# Patient Record
Sex: FEMALE | Race: WHITE | HISPANIC OR LATINO | URBAN - METROPOLITAN AREA
[De-identification: names, ages, dates, MRNs, and addresses within clinical notes are randomized per-mention and may not be internally consistent; named-entity substitution may affect disease eponyms.]

---

## 2017-06-01 ENCOUNTER — EMERGENCY (EMERGENCY)
Facility: HOSPITAL | Age: 27
LOS: 1 days | Discharge: HOME | End: 2017-06-01
Attending: EMERGENCY MEDICINE | Admitting: EMERGENCY MEDICINE
Payer: COMMERCIAL

## 2017-06-01 VITALS
DIASTOLIC BLOOD PRESSURE: 90 MMHG | HEART RATE: 98 BPM | HEIGHT: 62 IN | RESPIRATION RATE: 20 BRPM | TEMPERATURE: 98 F | SYSTOLIC BLOOD PRESSURE: 160 MMHG | OXYGEN SATURATION: 99 % | WEIGHT: 240.08 LBS

## 2017-06-01 DIAGNOSIS — Z98.89 OTHER SPECIFIED POSTPROCEDURAL STATES: Chronic | ICD-10-CM

## 2017-06-01 DIAGNOSIS — L05.91 PILONIDAL CYST WITHOUT ABSCESS: Chronic | ICD-10-CM

## 2017-06-01 PROCEDURE — 99284 EMERGENCY DEPT VISIT MOD MDM: CPT | Mod: 25

## 2017-06-02 LAB
APPEARANCE UR: ABNORMAL
BACTERIA # UR AUTO: ABNORMAL
BILIRUB UR-MCNC: NEGATIVE — SIGNIFICANT CHANGE UP
COD CRY URNS QL: ABNORMAL
COLOR SPEC: YELLOW — SIGNIFICANT CHANGE UP
COMMENT - URINE: SIGNIFICANT CHANGE UP
DIFF PNL FLD: ABNORMAL
EPI CELLS # UR: ABNORMAL
GLUCOSE UR QL: NEGATIVE MG/DL — SIGNIFICANT CHANGE UP
HCG UR QL: NEGATIVE — SIGNIFICANT CHANGE UP
KETONES UR-MCNC: ABNORMAL
LEUKOCYTE ESTERASE UR-ACNC: ABNORMAL
NITRITE UR-MCNC: NEGATIVE — SIGNIFICANT CHANGE UP
PH UR: 6 — SIGNIFICANT CHANGE UP (ref 5–8)
PROT UR-MCNC: 30 MG/DL
RBC CASTS # UR COMP ASSIST: SIGNIFICANT CHANGE UP /HPF (ref 0–4)
SP GR SPEC: 1.02 — SIGNIFICANT CHANGE UP (ref 1.01–1.02)
UROBILINOGEN FLD QL: NEGATIVE MG/DL — SIGNIFICANT CHANGE UP
WBC UR QL: SIGNIFICANT CHANGE UP

## 2017-06-02 PROCEDURE — 81001 URINALYSIS AUTO W/SCOPE: CPT

## 2017-06-02 PROCEDURE — 99283 EMERGENCY DEPT VISIT LOW MDM: CPT | Mod: 25

## 2017-06-02 PROCEDURE — 81025 URINE PREGNANCY TEST: CPT

## 2017-06-02 PROCEDURE — 96372 THER/PROPH/DIAG INJ SC/IM: CPT

## 2017-06-02 RX ORDER — METHOCARBAMOL 500 MG/1
1500 TABLET, FILM COATED ORAL ONCE
Qty: 0 | Refills: 0 | Status: COMPLETED | OUTPATIENT
Start: 2017-06-02 | End: 2017-06-02

## 2017-06-02 RX ORDER — IBUPROFEN 200 MG
1 TABLET ORAL
Qty: 15 | Refills: 0
Start: 2017-06-02 | End: 2017-06-07

## 2017-06-02 RX ORDER — KETOROLAC TROMETHAMINE 30 MG/ML
30 SYRINGE (ML) INJECTION ONCE
Qty: 0 | Refills: 0 | Status: DISCONTINUED | OUTPATIENT
Start: 2017-06-02 | End: 2017-06-02

## 2017-06-02 RX ORDER — METHOCARBAMOL 500 MG/1
2 TABLET, FILM COATED ORAL
Qty: 24 | Refills: 0
Start: 2017-06-02 | End: 2017-06-06

## 2017-06-02 RX ADMIN — METHOCARBAMOL 1500 MILLIGRAM(S): 500 TABLET, FILM COATED ORAL at 02:57

## 2017-06-02 RX ADMIN — Medication 30 MILLIGRAM(S): at 02:57

## 2017-06-02 NOTE — ED PROVIDER NOTE - OBJECTIVE STATEMENT
pt is a 26yo female with no significant pmhx c/o back pain x 5 days. pt reports on sunday she got out of bed and "twisted funny". pt endorses constant squeezing lower back pain with radiation to BL buttocks with "butt swelling". pt reports she took ibuprofen for the pain, last time earlier today. pt is a 26yo female with no significant pmhx c/o back pain x 5 days. pt reports on sunday she got out of bed and "twisted funny". pt endorses constant squeezing lower back pain with radiation to BL buttocks with "butt swelling". pt reports she took ibuprofen for the pain, last time earlier today. pt asking for work note. NKDA

## 2017-06-02 NOTE — ED PROVIDER NOTE - NS ED ROS FT
pt denies dysuria, saddle anesthesia, bowel or bladder incontinence, no injury to back, no heavy lifting.

## 2017-06-02 NOTE — ED PROVIDER NOTE - PROGRESS NOTE DETAILS
Ua reviewed. pt improved with robaxin and toradol. advised pt to follow up with PMD and spine center if pain persists. will rx robaxin and motrin. pt verbalized understanding and agreement with plan and dx will dc

## 2017-06-02 NOTE — ED PROVIDER NOTE - ATTENDING CONTRIBUTION TO CARE
27y old with complaints of back pain, able to walk, able to pass urien, and stool, , I personally saw the patient with the PA, and completed the key components of the history and physical exam. I then discussed the management plan with the PA.

## 2017-06-02 NOTE — ED PROVIDER NOTE - MEDICAL DECISION MAKING DETAILS
pt is a 28yo female with back pain without injury; therefore, no need for imaging at this time. pt neurologically intact. no muscle tenderness noted on examination. will do UA and given toradol/robaxin and re-evaluate.

## 2018-01-29 ENCOUNTER — APPOINTMENT (OUTPATIENT)
Dept: PLASTIC SURGERY | Facility: CLINIC | Age: 28
End: 2018-01-29

## 2019-10-22 ENCOUNTER — EMERGENCY (EMERGENCY)
Facility: HOSPITAL | Age: 29
LOS: 1 days | Discharge: HOME | End: 2019-10-22
Attending: STUDENT IN AN ORGANIZED HEALTH CARE EDUCATION/TRAINING PROGRAM
Payer: COMMERCIAL

## 2019-10-22 VITALS
SYSTOLIC BLOOD PRESSURE: 157 MMHG | HEART RATE: 95 BPM | TEMPERATURE: 98 F | RESPIRATION RATE: 20 BRPM | OXYGEN SATURATION: 94 % | HEIGHT: 62 IN | DIASTOLIC BLOOD PRESSURE: 99 MMHG | WEIGHT: 244.93 LBS

## 2019-10-22 DIAGNOSIS — L05.91 PILONIDAL CYST WITHOUT ABSCESS: Chronic | ICD-10-CM

## 2019-10-22 DIAGNOSIS — Z98.89 OTHER SPECIFIED POSTPROCEDURAL STATES: Chronic | ICD-10-CM

## 2019-10-22 LAB
ALBUMIN SERPL ELPH-MCNC: 4.3 G/DL — SIGNIFICANT CHANGE UP (ref 3.3–5.2)
ALP SERPL-CCNC: 82 U/L — SIGNIFICANT CHANGE UP (ref 40–120)
ALT FLD-CCNC: 23 U/L — SIGNIFICANT CHANGE UP
ANION GAP SERPL CALC-SCNC: 13 MMOL/L — SIGNIFICANT CHANGE UP (ref 5–17)
AST SERPL-CCNC: 24 U/L — SIGNIFICANT CHANGE UP
BASOPHILS # BLD AUTO: 0.04 K/UL — SIGNIFICANT CHANGE UP (ref 0–0.2)
BASOPHILS NFR BLD AUTO: 0.4 % — SIGNIFICANT CHANGE UP (ref 0–2)
BILIRUB SERPL-MCNC: 0.3 MG/DL — LOW (ref 0.4–2)
BUN SERPL-MCNC: 9 MG/DL — SIGNIFICANT CHANGE UP (ref 8–20)
CALCIUM SERPL-MCNC: 9.1 MG/DL — SIGNIFICANT CHANGE UP (ref 8.6–10.2)
CHLORIDE SERPL-SCNC: 100 MMOL/L — SIGNIFICANT CHANGE UP (ref 98–107)
CO2 SERPL-SCNC: 24 MMOL/L — SIGNIFICANT CHANGE UP (ref 22–29)
CREAT SERPL-MCNC: 0.75 MG/DL — SIGNIFICANT CHANGE UP (ref 0.5–1.3)
D DIMER BLD IA.RAPID-MCNC: 227 NG/ML DDU — SIGNIFICANT CHANGE UP
EOSINOPHIL # BLD AUTO: 0.18 K/UL — SIGNIFICANT CHANGE UP (ref 0–0.5)
EOSINOPHIL NFR BLD AUTO: 1.9 % — SIGNIFICANT CHANGE UP (ref 0–6)
GLUCOSE SERPL-MCNC: 93 MG/DL — SIGNIFICANT CHANGE UP (ref 70–115)
HCG SERPL-ACNC: <4 MIU/ML — SIGNIFICANT CHANGE UP
HCT VFR BLD CALC: 40.4 % — SIGNIFICANT CHANGE UP (ref 34.5–45)
HGB BLD-MCNC: 12.9 G/DL — SIGNIFICANT CHANGE UP (ref 11.5–15.5)
IMM GRANULOCYTES NFR BLD AUTO: 0.3 % — SIGNIFICANT CHANGE UP (ref 0–1.5)
LYMPHOCYTES # BLD AUTO: 1.73 K/UL — SIGNIFICANT CHANGE UP (ref 1–3.3)
LYMPHOCYTES # BLD AUTO: 18.5 % — SIGNIFICANT CHANGE UP (ref 13–44)
MAGNESIUM SERPL-MCNC: 2.1 MG/DL — SIGNIFICANT CHANGE UP (ref 1.6–2.6)
MCHC RBC-ENTMCNC: 27.2 PG — SIGNIFICANT CHANGE UP (ref 27–34)
MCHC RBC-ENTMCNC: 31.9 GM/DL — LOW (ref 32–36)
MCV RBC AUTO: 85.1 FL — SIGNIFICANT CHANGE UP (ref 80–100)
MONOCYTES # BLD AUTO: 0.67 K/UL — SIGNIFICANT CHANGE UP (ref 0–0.9)
MONOCYTES NFR BLD AUTO: 7.2 % — SIGNIFICANT CHANGE UP (ref 2–14)
NEUTROPHILS # BLD AUTO: 6.7 K/UL — SIGNIFICANT CHANGE UP (ref 1.8–7.4)
NEUTROPHILS NFR BLD AUTO: 71.7 % — SIGNIFICANT CHANGE UP (ref 43–77)
PLATELET # BLD AUTO: 290 K/UL — SIGNIFICANT CHANGE UP (ref 150–400)
POTASSIUM SERPL-MCNC: 4.3 MMOL/L — SIGNIFICANT CHANGE UP (ref 3.5–5.3)
POTASSIUM SERPL-SCNC: 4.3 MMOL/L — SIGNIFICANT CHANGE UP (ref 3.5–5.3)
PROT SERPL-MCNC: 7.6 G/DL — SIGNIFICANT CHANGE UP (ref 6.6–8.7)
RBC # BLD: 4.75 M/UL — SIGNIFICANT CHANGE UP (ref 3.8–5.2)
RBC # FLD: 12.6 % — SIGNIFICANT CHANGE UP (ref 10.3–14.5)
SODIUM SERPL-SCNC: 137 MMOL/L — SIGNIFICANT CHANGE UP (ref 135–145)
T4 AB SER-ACNC: 10.8 UG/DL — SIGNIFICANT CHANGE UP (ref 4.5–12)
TSH SERPL-MCNC: 2.15 UIU/ML — SIGNIFICANT CHANGE UP (ref 0.27–4.2)
WBC # BLD: 9.35 K/UL — SIGNIFICANT CHANGE UP (ref 3.8–10.5)
WBC # FLD AUTO: 9.35 K/UL — SIGNIFICANT CHANGE UP (ref 3.8–10.5)

## 2019-10-22 PROCEDURE — 84702 CHORIONIC GONADOTROPIN TEST: CPT

## 2019-10-22 PROCEDURE — 99283 EMERGENCY DEPT VISIT LOW MDM: CPT | Mod: 25

## 2019-10-22 PROCEDURE — 80053 COMPREHEN METABOLIC PANEL: CPT

## 2019-10-22 PROCEDURE — 84443 ASSAY THYROID STIM HORMONE: CPT

## 2019-10-22 PROCEDURE — 83735 ASSAY OF MAGNESIUM: CPT

## 2019-10-22 PROCEDURE — 93005 ELECTROCARDIOGRAM TRACING: CPT

## 2019-10-22 PROCEDURE — 99285 EMERGENCY DEPT VISIT HI MDM: CPT

## 2019-10-22 PROCEDURE — 71046 X-RAY EXAM CHEST 2 VIEWS: CPT | Mod: 26

## 2019-10-22 PROCEDURE — 84436 ASSAY OF TOTAL THYROXINE: CPT

## 2019-10-22 PROCEDURE — 71046 X-RAY EXAM CHEST 2 VIEWS: CPT

## 2019-10-22 PROCEDURE — 93010 ELECTROCARDIOGRAM REPORT: CPT

## 2019-10-22 PROCEDURE — 85027 COMPLETE CBC AUTOMATED: CPT

## 2019-10-22 PROCEDURE — 36415 COLL VENOUS BLD VENIPUNCTURE: CPT

## 2019-10-22 PROCEDURE — 85379 FIBRIN DEGRADATION QUANT: CPT

## 2019-10-22 NOTE — ED PROVIDER NOTE - NS ED ROS FT
No fever/chills, No photophobia/eye pain/changes in vision, No ear pain/sore throat/dysphagia, + chest pain/palpitations, no SOB/cough/wheeze/stridor, No abdominal pain, No N/V/D, no dysuria/frequency/discharge, No neck/back pain, no rash, no changes in neurological status/function.

## 2019-10-22 NOTE — ED PROVIDER NOTE - PHYSICAL EXAMINATION
Constitutional - well-developed; well nourished. Head - NCAT. Airway patent. Eyes - PERRL. CV - tachy, regular no murmur. no edema. Pulm - CTAB. Abd - soft, nt. no rebound. no guarding. Neuro - A&Ox3. strength 5/5 x4. sensation intact x4. normal gait. Skin - No rash. MSK - normal ROM.

## 2019-10-22 NOTE — ED PROVIDER NOTE - PATIENT PORTAL LINK FT
You can access the FollowMyHealth Patient Portal offered by [facility] by registering at the following website: [email or web address]. By joining [facility]’s FollowMyHealth portal, you will also be able to view your health information using other applications (apps) compatible with our system.

## 2019-10-22 NOTE — ED PROVIDER NOTE - CLINICAL SUMMARY MEDICAL DECISION MAKING FREE TEXT BOX
labs and imaging reviewed.  Pt with PVCs on the monitor likely causing her palpitations. Pt reassured and instructed to return for any new/worsening symptoms.

## 2019-10-22 NOTE — ED ADULT NURSE NOTE - NSIMPLEMENTINTERV_GEN_ALL_ED
Implemented All Universal Safety Interventions:  [place] to call system. Call bell, personal items and telephone within reach. Instruct patient to call for assistance. Room bathroom lighting operational. Non-slip footwear when patient is off stretcher. Physically safe environment: no spills, clutter or unnecessary equipment. Stretcher in lowest position, wheels locked, appropriate side rails in place.

## 2019-10-22 NOTE — ED ADULT NURSE NOTE - OBJECTIVE STATEMENT
pt came in for increased tiredness and fluttering in her chest, stated "I just havent felt myself, zoë been out of breath also."

## 2019-10-22 NOTE — ED PROVIDER NOTE - OBJECTIVE STATEMENT
Pt is a 28 yo F co palpitations.  PMHx significant for htn.  Pt states that yesterday she had palpitations and a sensation of pain that radiated up to her L neck.  Pt states that the pain has gone away but she is still having palpitations today.  no sob. no n/v. no fever/chills. no cough. no other complaints. Pt states that she is on OCPs.  PT also reports that she is being worked up by her doctor for a "kidney issue" that they discovered on US that could be contributing to her hypertension.

## 2020-01-28 NOTE — ED PROVIDER NOTE - CPE EDP NEURO NORM
Vitals capture started with the following parameters, Patient=Adult, Interval=5 min, Initial Pressure=150 mmHg, Deflation Rate=5 mmHg, Cuff placed on Right Arm normal...

## 2020-03-03 ENCOUNTER — EMERGENCY (EMERGENCY)
Facility: HOSPITAL | Age: 30
LOS: 1 days | Discharge: HOME | End: 2020-03-03
Attending: EMERGENCY MEDICINE
Payer: COMMERCIAL

## 2020-03-03 VITALS
OXYGEN SATURATION: 98 % | DIASTOLIC BLOOD PRESSURE: 93 MMHG | RESPIRATION RATE: 18 BRPM | HEART RATE: 104 BPM | WEIGHT: 240.08 LBS | SYSTOLIC BLOOD PRESSURE: 187 MMHG | TEMPERATURE: 98 F | HEIGHT: 62 IN

## 2020-03-03 VITALS
DIASTOLIC BLOOD PRESSURE: 67 MMHG | SYSTOLIC BLOOD PRESSURE: 119 MMHG | TEMPERATURE: 98 F | HEART RATE: 78 BPM | RESPIRATION RATE: 20 BRPM | OXYGEN SATURATION: 100 %

## 2020-03-03 DIAGNOSIS — Z98.89 OTHER SPECIFIED POSTPROCEDURAL STATES: Chronic | ICD-10-CM

## 2020-03-03 DIAGNOSIS — L05.91 PILONIDAL CYST WITHOUT ABSCESS: Chronic | ICD-10-CM

## 2020-03-03 PROBLEM — I10 ESSENTIAL (PRIMARY) HYPERTENSION: Chronic | Status: ACTIVE | Noted: 2019-10-22

## 2020-03-03 PROBLEM — R01.1 CARDIAC MURMUR, UNSPECIFIED: Chronic | Status: ACTIVE | Noted: 2019-10-22

## 2020-03-03 LAB
ALBUMIN SERPL ELPH-MCNC: 4.2 G/DL — SIGNIFICANT CHANGE UP (ref 3.3–5.2)
ALP SERPL-CCNC: 79 U/L — SIGNIFICANT CHANGE UP (ref 40–120)
ALT FLD-CCNC: 29 U/L — SIGNIFICANT CHANGE UP
ANION GAP SERPL CALC-SCNC: 13 MMOL/L — SIGNIFICANT CHANGE UP (ref 5–17)
AST SERPL-CCNC: 30 U/L — SIGNIFICANT CHANGE UP
BASOPHILS # BLD AUTO: 0.03 K/UL — SIGNIFICANT CHANGE UP (ref 0–0.2)
BASOPHILS NFR BLD AUTO: 0.4 % — SIGNIFICANT CHANGE UP (ref 0–2)
BILIRUB SERPL-MCNC: 0.3 MG/DL — LOW (ref 0.4–2)
BUN SERPL-MCNC: 9 MG/DL — SIGNIFICANT CHANGE UP (ref 8–20)
CALCIUM SERPL-MCNC: 9.2 MG/DL — SIGNIFICANT CHANGE UP (ref 8.6–10.2)
CHLORIDE SERPL-SCNC: 101 MMOL/L — SIGNIFICANT CHANGE UP (ref 98–107)
CO2 SERPL-SCNC: 25 MMOL/L — SIGNIFICANT CHANGE UP (ref 22–29)
CREAT SERPL-MCNC: 0.65 MG/DL — SIGNIFICANT CHANGE UP (ref 0.5–1.3)
D DIMER BLD IA.RAPID-MCNC: 167 NG/ML DDU — SIGNIFICANT CHANGE UP
EOSINOPHIL # BLD AUTO: 0.11 K/UL — SIGNIFICANT CHANGE UP (ref 0–0.5)
EOSINOPHIL NFR BLD AUTO: 1.3 % — SIGNIFICANT CHANGE UP (ref 0–6)
GLUCOSE SERPL-MCNC: 90 MG/DL — SIGNIFICANT CHANGE UP (ref 70–99)
HCG SERPL-ACNC: <4 MIU/ML — SIGNIFICANT CHANGE UP
HCT VFR BLD CALC: 39.6 % — SIGNIFICANT CHANGE UP (ref 34.5–45)
HGB BLD-MCNC: 13 G/DL — SIGNIFICANT CHANGE UP (ref 11.5–15.5)
IMM GRANULOCYTES NFR BLD AUTO: 0.4 % — SIGNIFICANT CHANGE UP (ref 0–1.5)
LYMPHOCYTES # BLD AUTO: 1.52 K/UL — SIGNIFICANT CHANGE UP (ref 1–3.3)
LYMPHOCYTES # BLD AUTO: 18.1 % — SIGNIFICANT CHANGE UP (ref 13–44)
MCHC RBC-ENTMCNC: 27.3 PG — SIGNIFICANT CHANGE UP (ref 27–34)
MCHC RBC-ENTMCNC: 32.8 GM/DL — SIGNIFICANT CHANGE UP (ref 32–36)
MCV RBC AUTO: 83 FL — SIGNIFICANT CHANGE UP (ref 80–100)
MONOCYTES # BLD AUTO: 0.57 K/UL — SIGNIFICANT CHANGE UP (ref 0–0.9)
MONOCYTES NFR BLD AUTO: 6.8 % — SIGNIFICANT CHANGE UP (ref 2–14)
NEUTROPHILS # BLD AUTO: 6.14 K/UL — SIGNIFICANT CHANGE UP (ref 1.8–7.4)
NEUTROPHILS NFR BLD AUTO: 73 % — SIGNIFICANT CHANGE UP (ref 43–77)
PLATELET # BLD AUTO: 285 K/UL — SIGNIFICANT CHANGE UP (ref 150–400)
POTASSIUM SERPL-MCNC: 4.1 MMOL/L — SIGNIFICANT CHANGE UP (ref 3.5–5.3)
POTASSIUM SERPL-SCNC: 4.1 MMOL/L — SIGNIFICANT CHANGE UP (ref 3.5–5.3)
PROT SERPL-MCNC: 7.2 G/DL — SIGNIFICANT CHANGE UP (ref 6.6–8.7)
RBC # BLD: 4.77 M/UL — SIGNIFICANT CHANGE UP (ref 3.8–5.2)
RBC # FLD: 12.6 % — SIGNIFICANT CHANGE UP (ref 10.3–14.5)
SODIUM SERPL-SCNC: 139 MMOL/L — SIGNIFICANT CHANGE UP (ref 135–145)
TROPONIN T SERPL-MCNC: <0.01 NG/ML — SIGNIFICANT CHANGE UP (ref 0–0.06)
WBC # BLD: 8.4 K/UL — SIGNIFICANT CHANGE UP (ref 3.8–10.5)
WBC # FLD AUTO: 8.4 K/UL — SIGNIFICANT CHANGE UP (ref 3.8–10.5)

## 2020-03-03 PROCEDURE — 71045 X-RAY EXAM CHEST 1 VIEW: CPT

## 2020-03-03 PROCEDURE — 93005 ELECTROCARDIOGRAM TRACING: CPT

## 2020-03-03 PROCEDURE — 84702 CHORIONIC GONADOTROPIN TEST: CPT

## 2020-03-03 PROCEDURE — 99285 EMERGENCY DEPT VISIT HI MDM: CPT

## 2020-03-03 PROCEDURE — 70450 CT HEAD/BRAIN W/O DYE: CPT | Mod: 26

## 2020-03-03 PROCEDURE — 80053 COMPREHEN METABOLIC PANEL: CPT

## 2020-03-03 PROCEDURE — 85379 FIBRIN DEGRADATION QUANT: CPT

## 2020-03-03 PROCEDURE — 71045 X-RAY EXAM CHEST 1 VIEW: CPT | Mod: 26

## 2020-03-03 PROCEDURE — 70450 CT HEAD/BRAIN W/O DYE: CPT

## 2020-03-03 PROCEDURE — 93010 ELECTROCARDIOGRAM REPORT: CPT | Mod: 76

## 2020-03-03 PROCEDURE — 84484 ASSAY OF TROPONIN QUANT: CPT

## 2020-03-03 PROCEDURE — 36415 COLL VENOUS BLD VENIPUNCTURE: CPT

## 2020-03-03 PROCEDURE — 85027 COMPLETE CBC AUTOMATED: CPT

## 2020-03-03 PROCEDURE — 99284 EMERGENCY DEPT VISIT MOD MDM: CPT | Mod: 25

## 2020-03-03 RX ORDER — LABETALOL HCL 100 MG
200 TABLET ORAL ONCE
Refills: 0 | Status: COMPLETED | OUTPATIENT
Start: 2020-03-03 | End: 2020-03-03

## 2020-03-03 RX ORDER — AMLODIPINE BESYLATE 2.5 MG/1
1 TABLET ORAL
Qty: 14 | Refills: 0
Start: 2020-03-03 | End: 2020-03-16

## 2020-03-03 RX ORDER — AMLODIPINE BESYLATE 2.5 MG/1
5 TABLET ORAL ONCE
Refills: 0 | Status: COMPLETED | OUTPATIENT
Start: 2020-03-03 | End: 2020-03-03

## 2020-03-03 RX ORDER — LABETALOL HCL 100 MG
1 TABLET ORAL
Qty: 28 | Refills: 0
Start: 2020-03-03 | End: 2020-03-16

## 2020-03-03 RX ADMIN — AMLODIPINE BESYLATE 5 MILLIGRAM(S): 2.5 TABLET ORAL at 13:50

## 2020-03-03 RX ADMIN — Medication 200 MILLIGRAM(S): at 13:58

## 2020-03-03 NOTE — ED ADULT TRIAGE NOTE - CHIEF COMPLAINT QUOTE
"I was at work when my chest started feeling weird and I just didn't feel right" "I think my blood pressure is high" c/o feeling as if BP was high, pt has missed HTN meds for 2x weeks. Reports feeling weak, numbness in arms and anxious. Denies HA denies v/d denies sick contacts. skin warm and dry, RR even and unlabored

## 2020-03-03 NOTE — ED STATDOCS - CARE PROVIDER_API CALL
Sulema Thorne (MD)  Cardiology; Internal Medicine  [address], [address]  [place], [place] [id number]  Phone: [phone]  Fax: [phone]  Follow Up Time:     Stuart Sykes; PhD)  Clinical Neurophysiology; Neurology  [address]  [place] [address]  Phone: [phone]  Fax: [phone]  Follow Up Time:

## 2020-03-03 NOTE — ED STATDOCS - CARE PROVIDERS DIRECT ADDRESSES
[email or web address]

## 2020-03-03 NOTE — ED STATDOCS - CLINICAL SUMMARY MEDICAL DECISION MAKING FREE TEXT BOX
Pt who is currently neurologically intact, lungs clear with no increased respiratory effort; reported neurological deficits appear likely related to anxiety, but given presence of elevated blood pressure and recent non-compliance with medications, will rule out organ dysfunction with labs, CT head; will control BP with prescribed medications (Norvasc, Labetalol) and will reassess. Pt who is currently neurologically intact, lungs clear with no increased respiratory effort; reported neurological deficits appear likely related to anxiety, but given presence of elevated blood pressure and recent non-compliance with medications, will rule out organ dysfunction with labs, CT head; will control BP with prescribed medications (Norvasc, Labetalol) and will reassess.  Imaging neg, neuro intact, feels better with non-ischemic ekg, atypical Sx for cardiac atiology.

## 2020-03-03 NOTE — ED STATDOCS - OBJECTIVE STATEMENT
29 year old F pt with heart murmur, HTN, cholecystectomy, 2x C-sections presents to the ED for evaluation of chest discomfort, slight arm numbness, weakness that gradually onset earlier today; pt states that she was at work on the phone, and suddenly began to stutter her words and not speak clearly, subsequently feeling somewhat anxious, reporting a feeling "as if there is a pit in my stomach." She has a known history of HTN, but states that she has not taken her medications (Norvasc 5 and Labetalol 200 BID) for over 2 weeks. She reports that she has had an intermittent tingling in her fingers bilaterally since symptoms onset earlier. She notes that she "just didn't feel right" before going to sleep last night. Symptoms improved after pt laid down for an hour while at work, but she still feels generally "off." No sick contact or recent travel. Denies HA, vision changes, shortness of breath, nausea, vomiting, diarrhea, abdominal pain. No further complaints at this time.

## 2020-03-03 NOTE — ED ADULT NURSE NOTE - NSIMPLEMENTINTERV_GEN_ALL_ED
Implemented All Universal Safety Interventions:  [place] to call system. Call bell, personal items and telephone within reach. Instruct patient to call for assistance. Room bathroom lighting operational. Non-slip footwear when patient is off stretcher. Physically safe environment: no spills, clutter or unnecessary equipment. Stretcher in lowest position, wheels locked, appropriate side rails in place.

## 2020-03-03 NOTE — ED STATDOCS - ATTENDING CONTRIBUTION TO CARE
I, Edward Varma, performed the initial face to face bedside interview with this patient regarding history of present illness, review of symptoms and relevant past medical, social and family history.  I completed an independent physical examination.  I was the initial provider who evaluated this patient. I have signed out the follow up of any pending tests (i.e. labs, radiological studies) to the ACP.  I have communicated the patient’s plan of care and disposition with the ACP.

## 2020-03-03 NOTE — ED STATDOCS - PROGRESS NOTE DETAILS
PA NOTE: No acute findings on imaging / lab work / EKG. Pt with improvement in symptoms at this time. Advised to follow up with cards / neuro ( Ethan / Onel ) upon discharge. Educated to return to ED if symptoms worsen.

## 2020-03-03 NOTE — ED STATDOCS - PATIENT PORTAL LINK FT
You can access the FollowMyHealth Patient Portal offered by [facility] by registering at the following website: [email or web address]. By joining [facility]’s FollowMyHealth portal, you will also be able to view your health information using other applications (apps) compatible with our system.

## 2020-06-19 ENCOUNTER — TRANSCRIPTION ENCOUNTER (OUTPATIENT)
Age: 30
End: 2020-06-19

## 2020-10-05 ENCOUNTER — APPOINTMENT (OUTPATIENT)
Dept: CARDIOLOGY | Facility: CLINIC | Age: 30
End: 2020-10-05
Payer: MEDICAID

## 2020-10-05 VITALS
OXYGEN SATURATION: 98 % | TEMPERATURE: 98 F | HEART RATE: 85 BPM | WEIGHT: 204 LBS | SYSTOLIC BLOOD PRESSURE: 133 MMHG | DIASTOLIC BLOOD PRESSURE: 85 MMHG

## 2020-10-05 DIAGNOSIS — Z01.810 ENCOUNTER FOR PREPROCEDURAL CARDIOVASCULAR EXAMINATION: ICD-10-CM

## 2020-10-05 DIAGNOSIS — R06.00 DYSPNEA, UNSPECIFIED: ICD-10-CM

## 2020-10-05 DIAGNOSIS — Z78.9 OTHER SPECIFIED HEALTH STATUS: ICD-10-CM

## 2020-10-05 PROCEDURE — 93000 ELECTROCARDIOGRAM COMPLETE: CPT

## 2020-10-05 PROCEDURE — 99204 OFFICE O/P NEW MOD 45 MIN: CPT

## 2020-10-05 NOTE — PHYSICAL EXAM
[General Appearance - Well Developed] : well developed [Normal Appearance] : normal appearance [Well Groomed] : well groomed [General Appearance - Well Nourished] : well nourished [No Deformities] : no deformities [General Appearance - In No Acute Distress] : no acute distress [Normal Conjunctiva] : the conjunctiva exhibited no abnormalities [Eyelids - No Xanthelasma] : the eyelids demonstrated no xanthelasmas [Normal Oral Mucosa] : normal oral mucosa [No Oral Pallor] : no oral pallor [No Oral Cyanosis] : no oral cyanosis [Normal Jugular Venous A Waves Present] : normal jugular venous A waves present [Normal Jugular Venous V Waves Present] : normal jugular venous V waves present [No Jugular Venous Brady A Waves] : no jugular venous brady A waves [Heart Rate And Rhythm] : heart rate and rhythm were normal [Heart Sounds] : normal S1 and S2 [Murmurs] : no murmurs present [Respiration, Rhythm And Depth] : normal respiratory rhythm and effort [Exaggerated Use Of Accessory Muscles For Inspiration] : no accessory muscle use [Auscultation Breath Sounds / Voice Sounds] : lungs were clear to auscultation bilaterally [Abdomen Soft] : soft [Abdomen Tenderness] : non-tender [Abdomen Mass (___ Cm)] : no abdominal mass palpated [Abnormal Walk] : normal gait [Gait - Sufficient For Exercise Testing] : the gait was sufficient for exercise testing [Nail Clubbing] : no clubbing of the fingernails [Cyanosis, Localized] : no localized cyanosis [Petechial Hemorrhages (___cm)] : no petechial hemorrhages [Skin Color & Pigmentation] : normal skin color and pigmentation [] : no rash [No Venous Stasis] : no venous stasis [Skin Lesions] : no skin lesions [No Skin Ulcers] : no skin ulcer [No Xanthoma] : no  xanthoma was observed [Oriented To Time, Place, And Person] : oriented to person, place, and time [Affect] : the affect was normal [Mood] : the mood was normal [No Anxiety] : not feeling anxious

## 2020-11-09 ENCOUNTER — APPOINTMENT (OUTPATIENT)
Dept: CARDIOLOGY | Facility: CLINIC | Age: 30
End: 2020-11-09
Payer: MEDICAID

## 2020-11-09 PROCEDURE — 99072 ADDL SUPL MATRL&STAF TM PHE: CPT

## 2020-11-09 PROCEDURE — 93015 CV STRESS TEST SUPVJ I&R: CPT

## 2020-11-09 PROCEDURE — 93306 TTE W/DOPPLER COMPLETE: CPT

## 2020-11-16 NOTE — ADDENDUM
[FreeTextEntry1] : ADDENDUM:\par Pt is optimized for the planned surgery from cardiac standpoint.\par \par \par Echo and EST done results reviewed and d/w pt. . \par EST: No evidence fo ischemia\par Echo: LLVEF 50-55. No sign valve abnl

## 2020-11-16 NOTE — HISTORY OF PRESENT ILLNESS
[FreeTextEntry1] : \par PREOPERATIVE CARDIOVASCULAR EXAMINATION\par Pt is planned to have gastric sleeve surgery and cardiac clearance wa requested.\par I get out of breath when I gop up stairs. This is relatively new, started n the covid era.\par Attributed to weight. denied modifying factors. Denied associated signs and symptoms. .\par Denied p, palpitation, dizziness, syncope, near syncope. chest pain.\par \par FUNCTIONAL CAPACITY\par RePorts <4.0 METS\par \par CHRONIC, STABLE CONDITIONS\par HTN\par

## 2022-02-24 ENCOUNTER — EMERGENCY (EMERGENCY)
Facility: HOSPITAL | Age: 32
LOS: 1 days | Discharge: HOME | End: 2022-02-24
Attending: EMERGENCY MEDICINE
Payer: COMMERCIAL

## 2022-02-24 VITALS — DIASTOLIC BLOOD PRESSURE: 91 MMHG | SYSTOLIC BLOOD PRESSURE: 156 MMHG | HEART RATE: 77 BPM

## 2022-02-24 VITALS
RESPIRATION RATE: 18 BRPM | OXYGEN SATURATION: 100 % | WEIGHT: 184.97 LBS | SYSTOLIC BLOOD PRESSURE: 169 MMHG | HEART RATE: 88 BPM | TEMPERATURE: 99 F | HEIGHT: 62 IN | DIASTOLIC BLOOD PRESSURE: 100 MMHG

## 2022-02-24 DIAGNOSIS — Z98.89 OTHER SPECIFIED POSTPROCEDURAL STATES: Chronic | ICD-10-CM

## 2022-02-24 DIAGNOSIS — L05.91 PILONIDAL CYST WITHOUT ABSCESS: Chronic | ICD-10-CM

## 2022-02-24 LAB
ALBUMIN SERPL ELPH-MCNC: 4.6 G/DL — SIGNIFICANT CHANGE UP (ref 3.3–5.2)
ALP SERPL-CCNC: 62 U/L — SIGNIFICANT CHANGE UP (ref 40–120)
ALT FLD-CCNC: 17 U/L — SIGNIFICANT CHANGE UP
ANION GAP SERPL CALC-SCNC: 11 MMOL/L — SIGNIFICANT CHANGE UP (ref 5–17)
AST SERPL-CCNC: 14 U/L — SIGNIFICANT CHANGE UP
BASOPHILS # BLD AUTO: 0.04 K/UL — SIGNIFICANT CHANGE UP (ref 0–0.2)
BASOPHILS NFR BLD AUTO: 0.6 % — SIGNIFICANT CHANGE UP (ref 0–2)
BILIRUB SERPL-MCNC: 0.3 MG/DL — LOW (ref 0.4–2)
BUN SERPL-MCNC: 9.4 MG/DL — SIGNIFICANT CHANGE UP (ref 8–20)
CALCIUM SERPL-MCNC: 9.5 MG/DL — SIGNIFICANT CHANGE UP (ref 8.6–10.2)
CHLORIDE SERPL-SCNC: 100 MMOL/L — SIGNIFICANT CHANGE UP (ref 98–107)
CO2 SERPL-SCNC: 27 MMOL/L — SIGNIFICANT CHANGE UP (ref 22–29)
CREAT SERPL-MCNC: 0.66 MG/DL — SIGNIFICANT CHANGE UP (ref 0.5–1.3)
EOSINOPHIL # BLD AUTO: 0.15 K/UL — SIGNIFICANT CHANGE UP (ref 0–0.5)
EOSINOPHIL NFR BLD AUTO: 2.2 % — SIGNIFICANT CHANGE UP (ref 0–6)
GLUCOSE SERPL-MCNC: 94 MG/DL — SIGNIFICANT CHANGE UP (ref 70–99)
HCG SERPL-ACNC: <4 MIU/ML — SIGNIFICANT CHANGE UP
HCT VFR BLD CALC: 39.9 % — SIGNIFICANT CHANGE UP (ref 34.5–45)
HGB BLD-MCNC: 12.9 G/DL — SIGNIFICANT CHANGE UP (ref 11.5–15.5)
IMM GRANULOCYTES NFR BLD AUTO: 0.3 % — SIGNIFICANT CHANGE UP (ref 0–1.5)
LIDOCAIN IGE QN: 26 U/L — SIGNIFICANT CHANGE UP (ref 22–51)
LYMPHOCYTES # BLD AUTO: 1.94 K/UL — SIGNIFICANT CHANGE UP (ref 1–3.3)
LYMPHOCYTES # BLD AUTO: 28.9 % — SIGNIFICANT CHANGE UP (ref 13–44)
MAGNESIUM SERPL-MCNC: 2.2 MG/DL — SIGNIFICANT CHANGE UP (ref 1.8–2.6)
MCHC RBC-ENTMCNC: 27.9 PG — SIGNIFICANT CHANGE UP (ref 27–34)
MCHC RBC-ENTMCNC: 32.3 GM/DL — SIGNIFICANT CHANGE UP (ref 32–36)
MCV RBC AUTO: 86.4 FL — SIGNIFICANT CHANGE UP (ref 80–100)
MONOCYTES # BLD AUTO: 0.47 K/UL — SIGNIFICANT CHANGE UP (ref 0–0.9)
MONOCYTES NFR BLD AUTO: 7 % — SIGNIFICANT CHANGE UP (ref 2–14)
NEUTROPHILS # BLD AUTO: 4.1 K/UL — SIGNIFICANT CHANGE UP (ref 1.8–7.4)
NEUTROPHILS NFR BLD AUTO: 61 % — SIGNIFICANT CHANGE UP (ref 43–77)
NT-PROBNP SERPL-SCNC: 124 PG/ML — SIGNIFICANT CHANGE UP (ref 0–300)
PLATELET # BLD AUTO: 303 K/UL — SIGNIFICANT CHANGE UP (ref 150–400)
POTASSIUM SERPL-MCNC: 4.1 MMOL/L — SIGNIFICANT CHANGE UP (ref 3.5–5.3)
POTASSIUM SERPL-SCNC: 4.1 MMOL/L — SIGNIFICANT CHANGE UP (ref 3.5–5.3)
PROT SERPL-MCNC: 7.4 G/DL — SIGNIFICANT CHANGE UP (ref 6.6–8.7)
RBC # BLD: 4.62 M/UL — SIGNIFICANT CHANGE UP (ref 3.8–5.2)
RBC # FLD: 12.5 % — SIGNIFICANT CHANGE UP (ref 10.3–14.5)
SARS-COV-2 RNA SPEC QL NAA+PROBE: SIGNIFICANT CHANGE UP
SODIUM SERPL-SCNC: 138 MMOL/L — SIGNIFICANT CHANGE UP (ref 135–145)
TROPONIN T SERPL-MCNC: <0.01 NG/ML — SIGNIFICANT CHANGE UP (ref 0–0.06)
WBC # BLD: 6.72 K/UL — SIGNIFICANT CHANGE UP (ref 3.8–10.5)
WBC # FLD AUTO: 6.72 K/UL — SIGNIFICANT CHANGE UP (ref 3.8–10.5)

## 2022-02-24 PROCEDURE — U0005: CPT

## 2022-02-24 PROCEDURE — 71046 X-RAY EXAM CHEST 2 VIEWS: CPT

## 2022-02-24 PROCEDURE — 83690 ASSAY OF LIPASE: CPT

## 2022-02-24 PROCEDURE — 85025 COMPLETE CBC W/AUTO DIFF WBC: CPT

## 2022-02-24 PROCEDURE — 99285 EMERGENCY DEPT VISIT HI MDM: CPT | Mod: 25

## 2022-02-24 PROCEDURE — 99285 EMERGENCY DEPT VISIT HI MDM: CPT

## 2022-02-24 PROCEDURE — U0003: CPT

## 2022-02-24 PROCEDURE — 71046 X-RAY EXAM CHEST 2 VIEWS: CPT | Mod: 26

## 2022-02-24 PROCEDURE — 84484 ASSAY OF TROPONIN QUANT: CPT

## 2022-02-24 PROCEDURE — 70450 CT HEAD/BRAIN W/O DYE: CPT | Mod: MA

## 2022-02-24 PROCEDURE — 93005 ELECTROCARDIOGRAM TRACING: CPT

## 2022-02-24 PROCEDURE — 96374 THER/PROPH/DIAG INJ IV PUSH: CPT

## 2022-02-24 PROCEDURE — 83735 ASSAY OF MAGNESIUM: CPT

## 2022-02-24 PROCEDURE — 36415 COLL VENOUS BLD VENIPUNCTURE: CPT

## 2022-02-24 PROCEDURE — 83880 ASSAY OF NATRIURETIC PEPTIDE: CPT

## 2022-02-24 PROCEDURE — 93010 ELECTROCARDIOGRAM REPORT: CPT

## 2022-02-24 PROCEDURE — 80053 COMPREHEN METABOLIC PANEL: CPT

## 2022-02-24 PROCEDURE — 84702 CHORIONIC GONADOTROPIN TEST: CPT

## 2022-02-24 PROCEDURE — 70450 CT HEAD/BRAIN W/O DYE: CPT | Mod: 26,MA

## 2022-02-24 RX ORDER — LABETALOL HCL 100 MG
10 TABLET ORAL ONCE
Refills: 0 | Status: COMPLETED | OUTPATIENT
Start: 2022-02-24 | End: 2022-02-24

## 2022-02-24 RX ORDER — AMLODIPINE BESYLATE 2.5 MG/1
1 TABLET ORAL
Qty: 30 | Refills: 0
Start: 2022-02-24 | End: 2022-03-25

## 2022-02-24 RX ORDER — AMLODIPINE BESYLATE 2.5 MG/1
1 TABLET ORAL
Qty: 0 | Refills: 0 | DISCHARGE

## 2022-02-24 RX ADMIN — Medication 10 MILLIGRAM(S): at 17:45

## 2022-02-24 NOTE — ED STATDOCS - NSICDXPASTSURGICALHX_GEN_ALL_CORE_FT
PAST SURGICAL HISTORY:  H/O:  section x2 (,)    History of cholecystectomy ()    Pilonidal cyst excision ()

## 2022-02-24 NOTE — ED PROVIDER NOTE - PROGRESS NOTE DETAILS
[place]: Patient reassessed, BP improved, patient feels better, results shared. Discussed follow up to cardiology as soon as possible, will increase amlodipine 5 mg to 10 mg, discussed indications to return and patient is comfortable with discharge.

## 2022-02-24 NOTE — ED PROVIDER NOTE - OBJECTIVE STATEMENT
32 y/o F with PMH HTN presents for elevated blood pressure, headaches x days, lightheadedness and vertigo x days and left arm heaviness that is subjective. Three days ago she had an episode of uncontrollable left arm twitching while lying in bed that has never occurred before and has not occurred since. She was conscious for the event and denies any other neuro deficits. She takes 200 mg labetalol and 5 mg amlodipine, but feels that her pressure is poorly controlled with that and has plans to see a cardiologist. She denies chest pain, SOB, neck pain, LE weakness, numbness of any kind.

## 2022-02-24 NOTE — ED PROVIDER NOTE - NSFOLLOWUPINSTRUCTIONS_ED_ALL_ED_FT
Migraine Headache      A migraine headache is a very strong throbbing pain on one side or both sides of your head. This type of headache can also cause other symptoms. It can last from 4 hours to 3 days. Talk with your doctor about what things may bring on (trigger) this condition.      What are the causes?    The exact cause of this condition is not known. This condition may be triggered or caused by:  •Drinking alcohol.      •Smoking.    •Taking medicines, such as:  •Medicine used to treat chest pain (nitroglycerin).      •Birth control pills.      •Estrogen.      •Some blood pressure medicines.        •Eating or drinking certain products.      •Doing physical activity.      Other things that may trigger a migraine headache include:  •Having a menstrual period.      •Pregnancy.      •Hunger.      •Stress.      •Not getting enough sleep or getting too much sleep.      •Weather changes.      •Tiredness (fatigue).        What increases the risk?    •Being 25–55 years old.      •Being female.      •Having a family history of migraine headaches.      •Being .      •Having depression or anxiety.      •Being very overweight.        What are the signs or symptoms?  •A throbbing pain. This pain may:  •Happen in any area of the head, such as on one side or both sides.      •Make it hard to do daily activities.      •Get worse with physical activity.      •Get worse around bright lights or loud noises.      •Other symptoms may include:  •Feeling sick to your stomach (nauseous).      •Vomiting.      •Dizziness.      •Being sensitive to bright lights, loud noises, or smells.      •Before you get a migraine headache, you may get warning signs (an aura). An aura may include:  •Seeing flashing lights or having blind spots.      •Seeing bright spots, halos, or zigzag lines.      •Having tunnel vision or blurred vision.      •Having numbness or a tingling feeling.      •Having trouble talking.      •Having weak muscles.      •Some people have symptoms after a migraine headache (postdromal phase), such as:  •Tiredness.      •Trouble thinking (concentrating).          How is this treated?  •Taking medicines that:  •Relieve pain.      •Relieve the feeling of being sick to your stomach.      •Prevent migraine headaches.      •Treatment may also include:  •Having acupuncture.      •Avoiding foods that bring on migraine headaches.      •Learning ways to control your body functions (biofeedback).      •Therapy to help you know and deal with negative thoughts (cognitive behavioral therapy).          Follow these instructions at home:    Medicines     •Take over-the-counter and prescription medicines only as told by your doctor.    •Ask your doctor if the medicine prescribed to you:  •Requires you to avoid driving or using heavy machinery.    •Can cause trouble pooping (constipation). You may need to take these steps to prevent or treat trouble pooping:  •Drink enough fluid to keep your pee (urine) pale yellow.      •Take over-the-counter or prescription medicines.      •Eat foods that are high in fiber. These include beans, whole grains, and fresh fruits and vegetables.      •Limit foods that are high in fat and sugar. These include fried or sweet foods.          Lifestyle     • Do not drink alcohol.      • Do not use any products that contain nicotine or tobacco, such as cigarettes, e-cigarettes, and chewing tobacco. If you need help quitting, ask your doctor.      •Get at least 8 hours of sleep every night.      •Limit and deal with stress.        General instructions                 •Keep a journal to find out what may bring on your migraine headaches. For example, write down:  •What you eat and drink.      •How much sleep you get.      •Any change in what you eat or drink.      •Any change in your medicines.      •If you have a migraine headache:  •Avoid things that make your symptoms worse, such as bright lights.      •It may help to lie down in a dark, quiet room.      •Do not drive or use heavy machinery.      •Ask your doctor what activities are safe for you.        •Keep all follow-up visits as told by your doctor. This is important.        Contact a doctor if:    •You get a migraine headache that is different or worse than others you have had.      •You have more than 15 headache days in one month.        Get help right away if:    •Your migraine headache gets very bad.      •Your migraine headache lasts longer than 72 hours.      •You have a fever.      •You have a stiff neck.      •You have trouble seeing.      •Your muscles feel weak or like you cannot control them.      •You start to lose your balance a lot.      •You start to have trouble walking.      •You pass out (faint).      •You have a seizure.        Summary    •A migraine headache is a very strong throbbing pain on one side or both sides of your head. These headaches can also cause other symptoms.      •This condition may be treated with medicines and changes to your lifestyle.      •Keep a journal to find out what may bring on your migraine headaches.      •Contact a doctor if you get a migraine headache that is different or worse than others you have had.      •Contact your doctor if you have more than 15 headache days in a month.      This information is not intended to replace advice given to you by your health care provider. Make sure you discuss any questions you have with your health care provider.      Chest Pain    WHAT YOU NEED TO KNOW:    Chest pain can be caused by a range of conditions, from not serious to life-threatening. Chest pain can be a symptom of a digestive problem, such as acid reflux or a stomach ulcer. An anxiety attack or a strong emotion, such as anger, can also cause chest pain. Infection, inflammation, or a fracture in the bones or cartilage in your chest can cause pain or discomfort. Sometimes chest pain or pressure is caused by poor blood flow to your heart (angina). Chest pain may also be caused by life-threatening conditions such as a heart attack or blood clot in your lungs.    DISCHARGE INSTRUCTIONS:    Call your local emergency number (911 in the ) or have someone call if:   •You have any of the following signs of a heart attack: ?Squeezing, pressure, or pain in your chest      ?You may also have any of the following: ?Discomfort or pain in your back, neck, jaw, stomach, or arm      ?Shortness of breath      ?Nausea or vomiting      ?Lightheadedness or a sudden cold sweat            Return to the emergency department if:   •You have chest discomfort that gets worse, even with medicine.      •You cough or vomit blood.      •Your bowel movements are black or bloody.      •You cannot stop vomiting, or it hurts to swallow.      Call your doctor if:   •You have questions or concerns about your condition or care.          Medicines:   •Medicines may be given to treat the cause of your chest pain. Examples include pain medicine, anxiety medicine, or medicines to increase blood flow to your heart.      •Do not take certain medicines without asking your healthcare provider first. These include NSAIDs, herbal or vitamin supplements, or hormones (estrogen or progestin).      •Take your medicine as directed. Contact your healthcare provider if you think your medicine is not helping or if you have side effects. Tell him or her if you are allergic to any medicine. Keep a list of the medicines, vitamins, and herbs you take. Include the amounts, and when and why you take them. Bring the list or the pill bottles to follow-up visits. Carry your medicine list with you in case of an emergency.      Healthy living tips: The following are general healthy guidelines. If the cause of your chest pain is known, your healthcare provider will give you specific guidelines to follow.  •Do not smoke. Nicotine and other chemicals in cigarettes and cigars can cause lung and heart damage. Ask your healthcare provider for information if you currently smoke and need help to quit. E-cigarettes or smokeless tobacco still contain nicotine. Talk to your healthcare provider before you use these products.      •Choose a variety of healthy foods as often as possible. Include fresh, frozen, or canned fruits and vegetables. Also include low-fat dairy products, fish, chicken (without skin), and lean meats. Your healthcare provider or a dietitian can help you create meal plans. You may need to avoid certain foods or drinks if your pain is caused by a digestion problem.  Healthy Foods           •Lower your sodium (salt) intake. Limit foods that are high in sodium, such as canned foods, salty snacks, and cold cuts. If you add salt when you cook food, do not add more at the table. Choose low-sodium canned foods as much as possible.             •Drink plenty of water every day. Water helps your body to control your temperature and blood pressure. Ask your healthcare provider how much water you should drink every day.      •Ask about activity. Your healthcare provider will tell you which activities to limit or avoid. Ask when you can drive, return to work, and have sex. Ask about the best exercise plan for you.      •Maintain a healthy weight. Ask your healthcare provider what a healthy weight is for you. Ask him or her to help you create a safe weight loss plan if you are overweight.      •Ask about vaccines you may need. Get the influenza (flu) vaccine every year as soon as recommended, usually in September or October. You may also need a pneumococcal vaccine to prevent pneumonia. The vaccine is usually given every 5 years, starting at age 65. Your healthcare provider can tell you if should get other vaccines, and when to get them.      Follow up with your healthcare provider within 72 hours, or as directed: You may need to return for more tests to find the cause of your chest pain. You may be referred to a specialist, such as a cardiologist or gastroenterologist. Write down your questions so you remember to ask them during your visits.

## 2022-02-24 NOTE — ED PROVIDER NOTE - CLINICAL SUMMARY MEDICAL DECISION MAKING FREE TEXT BOX
32 y/o F with PMh HTN poorly controlled on her 5 mg amlodipine and 200 mg labetalol presents for a few days of left arm heaviness, HA and lighteadedness. Her episode of arm twitching that occurred 3 days ago has not recurred - she has no neuro deficits on exam, EKG is WNL, but patient is hypertensive. CT head ordered by intake physician is unremarkable - she can follow up outpatient with neurology for further work up for headaches. Will treat BP, increase patient's amlodipine to 10 mg and recommend outpatient cardiology follow up.

## 2022-02-24 NOTE — ED STATDOCS - NSICDXFAMILYHX_GEN_ALL_CORE_FT
FAMILY HISTORY:  Grandparent  Still living? No  Family history of breast cancer, Age at diagnosis: Age Unknown  Family history of diabetes mellitus, Age at diagnosis: Age Unknown  Family history of lung cancer, Age at diagnosis: Age Unknown

## 2022-02-24 NOTE — ED PROVIDER NOTE - CARE PROVIDER_API CALL
Prakash Mora; PhD)  Neurology; Vascular Neurology  [address]  [place] [address]  Phone: [phone]  Fax: [phone]  Follow Up Time: Urgent

## 2022-02-24 NOTE — ED PROVIDER NOTE - PATIENT PORTAL LINK FT
You can access the FollowMyHealth Patient Portal offered by [facility] by registering at the following website: [email or web address]. By joining [employer]’s FollowMyHealth portal, you will also be able to view your health information using other applications (apps) compatible with our system.

## 2022-02-24 NOTE — ED STATDOCS - PROGRESS NOTE DETAILS
Diana CONTRERAS for ED attending, Dr. Gentile: 30 Y/O female w/ PMHx. of HTN, heart murmur and PSHx. of cholecystectomy presents to ED c/o dizziness, increased episodes of sweating, generalized malaise and left shoulder/arm heaviness for the past few days. PT states she had episode of uncontrollable arm twitching w/ blood pressure of 160/100 PTA. PT denies past seizure activity or DM. PT w/ left arm weakness on initial exam. Pt will be placed in the main ED for complete evaluation by another provider. Priority CT called. PT likely w/ focal seizure of left arm.

## 2022-02-24 NOTE — ED PROVIDER NOTE - NSFOLLOWUPCLINICS_GEN_ALL_ED_FT
[facility] Cardiology  Cardiology  [address], [address]  [place] [address]  Phone: [phone]  Fax:   Follow Up Time: Urgent

## 2022-02-24 NOTE — ED PROVIDER NOTE - NS ED ROS FT
Const: Denies fever, chills  HEENT: Denies blurry vision, sore throat  Neck: Denies neck pain/stiffness  Resp: Denies coughing, SOB  Cardiovascular: Denies CP, palpitations, LE edema  GI: Denies nausea, vomiting, abdominal pain, diarrhea, constipation, blood in stool  : Denies urinary frequency/urgency/dysuria, hematuria  MSK: Denies back pain  Neuro: + HA, + dizziness,+ weakness, Denies numbness  Skin: Denies rashes.

## 2022-02-24 NOTE — ED ADULT TRIAGE NOTE - CHIEF COMPLAINT QUOTE
pt c/o not feeling well c/o feeling dizzy x a few days on & off, left shoulder pain & left arm heaviness, /100  A&Ox3, resp wnl, nad, no other neuro deficits

## 2022-02-25 ENCOUNTER — NON-APPOINTMENT (OUTPATIENT)
Age: 32
End: 2022-02-25

## 2022-02-25 DIAGNOSIS — Z92.89 PERSONAL HISTORY OF OTHER MEDICAL TREATMENT: ICD-10-CM

## 2022-02-25 DIAGNOSIS — R07.89 OTHER CHEST PAIN: ICD-10-CM

## 2022-02-25 DIAGNOSIS — Z87.898 PERSONAL HISTORY OF OTHER SPECIFIED CONDITIONS: ICD-10-CM

## 2022-03-14 ENCOUNTER — NON-APPOINTMENT (OUTPATIENT)
Age: 32
End: 2022-03-14

## 2022-03-14 ENCOUNTER — APPOINTMENT (OUTPATIENT)
Dept: CARDIOLOGY | Facility: CLINIC | Age: 32
End: 2022-03-14
Payer: MEDICAID

## 2022-03-14 VITALS — DIASTOLIC BLOOD PRESSURE: 88 MMHG | SYSTOLIC BLOOD PRESSURE: 138 MMHG

## 2022-03-14 VITALS
DIASTOLIC BLOOD PRESSURE: 80 MMHG | SYSTOLIC BLOOD PRESSURE: 162 MMHG | TEMPERATURE: 98.4 F | BODY MASS INDEX: 34.23 KG/M2 | HEIGHT: 62 IN | WEIGHT: 186 LBS

## 2022-03-14 VITALS — DIASTOLIC BLOOD PRESSURE: 94 MMHG | SYSTOLIC BLOOD PRESSURE: 142 MMHG

## 2022-03-14 DIAGNOSIS — Z09 ENCOUNTER FOR FOLLOW-UP EXAMINATION AFTER COMPLETED TREATMENT FOR CONDITIONS OTHER THAN MALIGNANT NEOPLASM: ICD-10-CM

## 2022-03-14 PROCEDURE — 93000 ELECTROCARDIOGRAM COMPLETE: CPT

## 2022-03-14 PROCEDURE — 99072 ADDL SUPL MATRL&STAF TM PHE: CPT

## 2022-03-14 PROCEDURE — 99214 OFFICE O/P EST MOD 30 MIN: CPT

## 2022-03-14 RX ORDER — LABETALOL HYDROCHLORIDE 200 MG/1
200 TABLET, FILM COATED ORAL 3 TIMES DAILY
Qty: 270 | Refills: 1 | Status: ACTIVE | COMMUNITY
Start: 2020-10-05 | End: 1900-01-01

## 2022-03-14 RX ORDER — NORGESTIMATE AND ETHINYL ESTRADIOL 7DAYSX3 28
KIT ORAL
Refills: 0 | Status: ACTIVE | COMMUNITY

## 2022-09-19 ENCOUNTER — APPOINTMENT (OUTPATIENT)
Dept: CARDIOLOGY | Facility: CLINIC | Age: 32
End: 2022-09-19

## 2022-09-26 NOTE — ED ADULT TRIAGE NOTE - TEMPERATURE IN FAHRENHEIT (DEGREES F)
It was my pleasure seeing you today.  I look forward to working with you. If after having time to think about the information presented, something is unclear or you have further questions, please reach out to me via phone or through the portal.     Please be aware that you may receive a survey regarding today's visit either in the mail or electronically. Please take the time to complete the survey as I am always hoping to improve the care I provide.      Thank you for your consideration.    Pina Scott M.D.       
98.2

## 2023-11-09 ENCOUNTER — INPATIENT (INPATIENT)
Facility: HOSPITAL | Age: 33
LOS: 2 days | Discharge: HOME | DRG: 833 | End: 2023-11-12
Attending: OBSTETRICS & GYNECOLOGY | Admitting: OBSTETRICS & GYNECOLOGY
Payer: COMMERCIAL

## 2023-11-09 VITALS
TEMPERATURE: 99 F | DIASTOLIC BLOOD PRESSURE: 91 MMHG | RESPIRATION RATE: 16 BRPM | SYSTOLIC BLOOD PRESSURE: 162 MMHG | HEART RATE: 96 BPM

## 2023-11-09 DIAGNOSIS — Z98.89 OTHER SPECIFIED POSTPROCEDURAL STATES: Chronic | ICD-10-CM

## 2023-11-09 DIAGNOSIS — L05.91 PILONIDAL CYST WITHOUT ABSCESS: Chronic | ICD-10-CM

## 2023-11-09 DIAGNOSIS — O26.899 OTHER SPECIFIED PREGNANCY RELATED CONDITIONS, UNSPECIFIED TRIMESTER: ICD-10-CM

## 2023-11-09 DIAGNOSIS — O46.90 ANTEPARTUM HEMORRHAGE, UNSPECIFIED, UNSPECIFIED TRIMESTER: ICD-10-CM

## 2023-11-09 LAB
APPEARANCE UR: ABNORMAL
APPEARANCE UR: ABNORMAL
BASOPHILS # BLD AUTO: 0.02 K/UL — SIGNIFICANT CHANGE UP (ref 0–0.2)
BASOPHILS # BLD AUTO: 0.02 K/UL — SIGNIFICANT CHANGE UP (ref 0–0.2)
BASOPHILS NFR BLD AUTO: 0.2 % — SIGNIFICANT CHANGE UP (ref 0–2)
BASOPHILS NFR BLD AUTO: 0.2 % — SIGNIFICANT CHANGE UP (ref 0–2)
BILIRUB UR-MCNC: NEGATIVE — SIGNIFICANT CHANGE UP
BILIRUB UR-MCNC: NEGATIVE — SIGNIFICANT CHANGE UP
COLOR SPEC: SIGNIFICANT CHANGE UP
COLOR SPEC: SIGNIFICANT CHANGE UP
DIFF PNL FLD: NEGATIVE — SIGNIFICANT CHANGE UP
DIFF PNL FLD: NEGATIVE — SIGNIFICANT CHANGE UP
EOSINOPHIL # BLD AUTO: 0.14 K/UL — SIGNIFICANT CHANGE UP (ref 0–0.5)
EOSINOPHIL # BLD AUTO: 0.14 K/UL — SIGNIFICANT CHANGE UP (ref 0–0.5)
EOSINOPHIL NFR BLD AUTO: 1.5 % — SIGNIFICANT CHANGE UP (ref 0–6)
EOSINOPHIL NFR BLD AUTO: 1.5 % — SIGNIFICANT CHANGE UP (ref 0–6)
GLUCOSE UR QL: NEGATIVE MG/DL — SIGNIFICANT CHANGE UP
GLUCOSE UR QL: NEGATIVE MG/DL — SIGNIFICANT CHANGE UP
HCT VFR BLD CALC: 29.5 % — LOW (ref 34.5–45)
HCT VFR BLD CALC: 29.5 % — LOW (ref 34.5–45)
HGB BLD-MCNC: 9.5 G/DL — LOW (ref 11.5–15.5)
HGB BLD-MCNC: 9.5 G/DL — LOW (ref 11.5–15.5)
IMM GRANULOCYTES NFR BLD AUTO: 0.4 % — SIGNIFICANT CHANGE UP (ref 0–0.9)
IMM GRANULOCYTES NFR BLD AUTO: 0.4 % — SIGNIFICANT CHANGE UP (ref 0–0.9)
KETONES UR-MCNC: ABNORMAL MG/DL
KETONES UR-MCNC: ABNORMAL MG/DL
LEUKOCYTE ESTERASE UR-ACNC: ABNORMAL
LEUKOCYTE ESTERASE UR-ACNC: ABNORMAL
LYMPHOCYTES # BLD AUTO: 1.51 K/UL — SIGNIFICANT CHANGE UP (ref 1–3.3)
LYMPHOCYTES # BLD AUTO: 1.51 K/UL — SIGNIFICANT CHANGE UP (ref 1–3.3)
LYMPHOCYTES # BLD AUTO: 16.6 % — SIGNIFICANT CHANGE UP (ref 13–44)
LYMPHOCYTES # BLD AUTO: 16.6 % — SIGNIFICANT CHANGE UP (ref 13–44)
MCHC RBC-ENTMCNC: 26.5 PG — LOW (ref 27–34)
MCHC RBC-ENTMCNC: 26.5 PG — LOW (ref 27–34)
MCHC RBC-ENTMCNC: 32.2 GM/DL — SIGNIFICANT CHANGE UP (ref 32–36)
MCHC RBC-ENTMCNC: 32.2 GM/DL — SIGNIFICANT CHANGE UP (ref 32–36)
MCV RBC AUTO: 82.4 FL — SIGNIFICANT CHANGE UP (ref 80–100)
MCV RBC AUTO: 82.4 FL — SIGNIFICANT CHANGE UP (ref 80–100)
MONOCYTES # BLD AUTO: 0.76 K/UL — SIGNIFICANT CHANGE UP (ref 0–0.9)
MONOCYTES # BLD AUTO: 0.76 K/UL — SIGNIFICANT CHANGE UP (ref 0–0.9)
MONOCYTES NFR BLD AUTO: 8.4 % — SIGNIFICANT CHANGE UP (ref 2–14)
MONOCYTES NFR BLD AUTO: 8.4 % — SIGNIFICANT CHANGE UP (ref 2–14)
NEUTROPHILS # BLD AUTO: 6.62 K/UL — SIGNIFICANT CHANGE UP (ref 1.8–7.4)
NEUTROPHILS # BLD AUTO: 6.62 K/UL — SIGNIFICANT CHANGE UP (ref 1.8–7.4)
NEUTROPHILS NFR BLD AUTO: 72.9 % — SIGNIFICANT CHANGE UP (ref 43–77)
NEUTROPHILS NFR BLD AUTO: 72.9 % — SIGNIFICANT CHANGE UP (ref 43–77)
NITRITE UR-MCNC: NEGATIVE — SIGNIFICANT CHANGE UP
NITRITE UR-MCNC: NEGATIVE — SIGNIFICANT CHANGE UP
PH UR: 6.5 — SIGNIFICANT CHANGE UP (ref 5–8)
PH UR: 6.5 — SIGNIFICANT CHANGE UP (ref 5–8)
PLATELET # BLD AUTO: 273 K/UL — SIGNIFICANT CHANGE UP (ref 150–400)
PLATELET # BLD AUTO: 273 K/UL — SIGNIFICANT CHANGE UP (ref 150–400)
PROT UR-MCNC: 30 MG/DL
PROT UR-MCNC: 30 MG/DL
RBC # BLD: 3.58 M/UL — LOW (ref 3.8–5.2)
RBC # BLD: 3.58 M/UL — LOW (ref 3.8–5.2)
RBC # FLD: 12.2 % — SIGNIFICANT CHANGE UP (ref 10.3–14.5)
RBC # FLD: 12.2 % — SIGNIFICANT CHANGE UP (ref 10.3–14.5)
SP GR SPEC: 1.03 — SIGNIFICANT CHANGE UP (ref 1–1.03)
SP GR SPEC: 1.03 — SIGNIFICANT CHANGE UP (ref 1–1.03)
UROBILINOGEN FLD QL: 1 MG/DL — SIGNIFICANT CHANGE UP (ref 0.2–1)
UROBILINOGEN FLD QL: 1 MG/DL — SIGNIFICANT CHANGE UP (ref 0.2–1)
WBC # BLD: 9.09 K/UL — SIGNIFICANT CHANGE UP (ref 3.8–10.5)
WBC # BLD: 9.09 K/UL — SIGNIFICANT CHANGE UP (ref 3.8–10.5)
WBC # FLD AUTO: 9.09 K/UL — SIGNIFICANT CHANGE UP (ref 3.8–10.5)
WBC # FLD AUTO: 9.09 K/UL — SIGNIFICANT CHANGE UP (ref 3.8–10.5)

## 2023-11-09 RX ORDER — SODIUM CHLORIDE 9 MG/ML
1000 INJECTION, SOLUTION INTRAVENOUS
Refills: 0 | Status: DISCONTINUED | OUTPATIENT
Start: 2023-11-09 | End: 2023-11-09

## 2023-11-09 RX ORDER — MAGNESIUM SULFATE 500 MG/ML
2 VIAL (ML) INJECTION
Qty: 40 | Refills: 0 | Status: DISCONTINUED | OUTPATIENT
Start: 2023-11-09 | End: 2023-11-09

## 2023-11-09 RX ORDER — SODIUM CHLORIDE 9 MG/ML
1000 INJECTION, SOLUTION INTRAVENOUS ONCE
Refills: 0 | Status: COMPLETED | OUTPATIENT
Start: 2023-11-09 | End: 2023-11-09

## 2023-11-09 RX ORDER — LABETALOL HCL 100 MG
200 TABLET ORAL DAILY
Refills: 0 | Status: DISCONTINUED | OUTPATIENT
Start: 2023-11-09 | End: 2023-11-10

## 2023-11-09 RX ORDER — MAGNESIUM SULFATE 500 MG/ML
4 VIAL (ML) INJECTION ONCE
Refills: 0 | Status: DISCONTINUED | OUTPATIENT
Start: 2023-11-09 | End: 2023-11-09

## 2023-11-09 RX ADMIN — SODIUM CHLORIDE 1000 MILLILITER(S): 9 INJECTION, SOLUTION INTRAVENOUS at 23:24

## 2023-11-09 RX ADMIN — Medication 200 MILLIGRAM(S): at 23:33

## 2023-11-09 RX ADMIN — Medication 300 GRAM(S): at 23:53

## 2023-11-09 NOTE — OB PROVIDER H&P - HISTORY OF PRESENT ILLNESS
33y  at 24w5d GA by LMP 23 who presents to L&D for L sided pain, fatigue and elevated BP to 160s at home. Patient has a hx of cHTN on labetalol 200mg BID and checks BPs daily at home. BPs usually 120-130s, however the past two days have been 160s. Patient states takes labetalol at 10AM/10PM, took morning dose, due for PM dose. Patient also endorses 2 episodes of vomiting today. Patient denies vaginal bleeding, contractions and leakage of fluid. She endorses good fetal movement. Patient denies HA, visual changes and epigastric pain. Denies fevers, chills, nausea, vomiting, chest pain, SOB, dizziness and headache. No other complaints at this time.     LMP: 2023  MAGALY: 2024    Prenatal course complicated by:  1. Limited PNC - 2 OB visits this pregnancy  2. cHTN, on labetalol 200mg BID  3. Hx of uterine rupture  4. Hx of CSx2    POB:   G1: pCS (; failed IOL, complicated by uterine rupture)  G2: rCS () complicated by gHTN  PGYN: -fibroids, -ovarian cysts, denies STD hx, denies abnormal PAPs   PMH: cHTN, anxiety  PSH: CS x2, gastric sleeve (), cholecystectomy (), excision of pilonidal cyst  SH: Denies EtOH, tobacco and illicit drug use during this pregnancy  Meds: PNVs, labetalol 200mg BID  Allergies: NKDA 33y  at 24w5d GA by LMP 23 who presents to L&D for L sided pain, fatigue and elevated BP to 160s at home. Patient has a hx of cHTN on labetalol 200mg BID and checks BPs daily at home. BPs usually 120-130s, however the past two days have been 160s. Patient states takes labetalol at 10AM/10PM, took morning dose, due for PM dose. Patient also endorses 2 episodes of vomiting today. Patient denies vaginal bleeding, contractions and leakage of fluid. She endorses good fetal movement. Patient denies HA, visual changes and epigastric pain. Denies fevers, chills, nausea, vomiting, chest pain, SOB, dizziness and headache. No other complaints at this time.     LMP: 2023  MAGALY: 2024    Prenatal course complicated by:  1. Limited PNC - 2 OB visits this pregnancy - at last OB visit told she had a lot of protein in the urine  2. cHTN, on labetalol 200mg BID  3. Hx of uterine rupture  4. Hx of CSx2    POB:   G1: pCS (; failed IOL, complicated by uterine rupture)  G2: rCS () complicated by gHTN  PGYN: -fibroids, -ovarian cysts, denies STD hx, denies abnormal PAPs   PMH: cHTN, anxiety  PSH: CS x2, gastric sleeve (), cholecystectomy (), excision of pilonidal cyst  SH: Denies EtOH, tobacco and illicit drug use during this pregnancy  Meds: PNVs, labetalol 200mg BID  Allergies: NKDA 33y  at 24w5d GA by LMP 23 who presents to L&D for L sided pain, fatigue and elevated BP to 160s at home. Patient has a hx of cHTN on labetalol 200mg BID and checks BPs daily at home. BPs usually 120-130s, however the past two days have been 160s. Patient states takes labetalol at 10AM/10PM, took morning dose, due for PM dose. Patient also endorses 2 episodes of vomiting today. Patient denies vaginal bleeding, contractions and leakage of fluid. She endorses good fetal movement. Patient denies HA, visual changes and epigastric pain. Denies fevers, chills, nausea, vomiting, chest pain, SOB, dizziness and headache.     LMP: 2023  MAGALY: 2024    Prenatal course complicated by:  1. Limited PNC - 2 OB visits this pregnancy - at last OB visit told she had a lot of protein in the urine  2. cHTN, on labetalol 200mg BID  3. Hx of uterine rupture  4. Hx of CSx2    POB:   G1: pCS (; failed IOL, complicated by uterine rupture)  G2: rCS () complicated by gHTN  PGYN: -fibroids, -ovarian cysts, denies STD hx, denies abnormal PAPs   PMH: cHTN, anxiety  PSH: CS x2, gastric sleeve (), cholecystectomy (), excision of pilonidal cyst  SH: Denies EtOH, tobacco and illicit drug use during this pregnancy  Meds: PNVs, labetalol 200mg BID  Allergies: NKDA

## 2023-11-09 NOTE — OB PROVIDER H&P - NSOBPROC_OBGYN_ALL_OB
Prescription refill    Last OV:07/25/2022    Last Refill:06/20/2022    Labs:07/25/2022    Future Appt: 11/14/2022 Unknown at This Time

## 2023-11-09 NOTE — OB PROVIDER H&P - ASSESSMENT
33y  at 24w5d GA by LMP 23 who is admitted for cHTN exacerbation vs cHTN with superimposed PEC.    -Admit   -Consent  -Admission labs, PIH Labs  -IV fluid bolus  -UA, UCx  -TVUS for CL, FFN  -PNL, A1C  -FHT reassuring  -MFM consult    Discussed with Dr. Cain

## 2023-11-09 NOTE — OB PROVIDER H&P - ATTENDING COMMENTS
Patient presents with lower abdominal cramping and generally not feeling well.  She was following with an OB and 4 weeks ago told she has a lot of protein in her urine and to see doctors at another hospital.  She has known chronic hypertension and takes labetalol 200mg twice a day.  Recently her BPs are elevated to SBP 160s above her usual 120-130s. Denies headache, vision changes, RUQ/epigastric pain.  Denies vaginal bleeding/leaking fluid.   First pregnancy complicated by primary csection after labor, and found to have a uterine rupture intraop.   REpeat csection at 37 weeks for history of uterine rupture    I reviewed the blood pressure findings with the patient.  I recommend admission at this time, and to initiate treatment for possible superimposed preeclampsia.  Will start magnesium for seizure prophylaxis, and give beta for fetal lung maturity.  Will treat blood pressures as needed.  Patient advised if delivery indicated would be having a repeat csection.  Risk of csection reviewed including hemorrhage, infection, possible adhesions, possible injury to surrounding organs like bowel or bladder.  Patient consents to blood transfusion if neeeded.

## 2023-11-09 NOTE — OB PROVIDER H&P - NSHPPHYSICALEXAM_GEN_ALL_CORE
T(C): 37 (11-09-23 @ 22:24), Max: 37 (11-09-23 @ 22:24)  HR: 94 (11-09-23 @ 23:53) (87 - 111)  BP: 143/67 (11-09-23 @ 23:53) (136/83 - 165/80)  RR: 16 (11-09-23 @ 22:24) (16 - 16)  SpO2: 98% (11-09-23 @ 23:53) (90% - 100%)    Gen: NAD, well-appearing, AAOx3   Abd: Soft, gravid, nontender  Ext: non-tender, non-edematous    FHT: baseline 140, moderate variability, +accels, -decels   [place]: CTX q2min

## 2023-11-10 DIAGNOSIS — Z3A.24 24 WEEKS GESTATION OF PREGNANCY: ICD-10-CM

## 2023-11-10 DIAGNOSIS — O34.219 MATERNAL CARE FOR UNSPECIFIED TYPE SCAR FROM PREVIOUS CESAREAN DELIVERY: ICD-10-CM

## 2023-11-10 DIAGNOSIS — O26.899 OTHER SPECIFIED PREGNANCY RELATED CONDITIONS, UNSPECIFIED TRIMESTER: ICD-10-CM

## 2023-11-10 DIAGNOSIS — I10 ESSENTIAL (PRIMARY) HYPERTENSION: ICD-10-CM

## 2023-11-10 DIAGNOSIS — S37.69XD: ICD-10-CM

## 2023-11-10 DIAGNOSIS — O10.919 UNSPECIFIED PRE-EXISTING HYPERTENSION COMPLICATING PREGNANCY, UNSPECIFIED TRIMESTER: ICD-10-CM

## 2023-11-10 LAB
A1C WITH ESTIMATED AVERAGE GLUCOSE RESULT: 5 % — SIGNIFICANT CHANGE UP (ref 4–5.6)
A1C WITH ESTIMATED AVERAGE GLUCOSE RESULT: 5 % — SIGNIFICANT CHANGE UP (ref 4–5.6)
ALBUMIN SERPL ELPH-MCNC: 3.4 G/DL — SIGNIFICANT CHANGE UP (ref 3.3–5.2)
ALBUMIN SERPL ELPH-MCNC: 3.4 G/DL — SIGNIFICANT CHANGE UP (ref 3.3–5.2)
ALBUMIN SERPL ELPH-MCNC: 3.7 G/DL — SIGNIFICANT CHANGE UP (ref 3.3–5.2)
ALBUMIN SERPL ELPH-MCNC: 3.7 G/DL — SIGNIFICANT CHANGE UP (ref 3.3–5.2)
ALP SERPL-CCNC: 73 U/L — SIGNIFICANT CHANGE UP (ref 40–120)
ALP SERPL-CCNC: 73 U/L — SIGNIFICANT CHANGE UP (ref 40–120)
ALP SERPL-CCNC: 76 U/L — SIGNIFICANT CHANGE UP (ref 40–120)
ALP SERPL-CCNC: 76 U/L — SIGNIFICANT CHANGE UP (ref 40–120)
ALT FLD-CCNC: 10 U/L — SIGNIFICANT CHANGE UP
ANION GAP SERPL CALC-SCNC: 12 MMOL/L — SIGNIFICANT CHANGE UP (ref 5–17)
ANION GAP SERPL CALC-SCNC: 12 MMOL/L — SIGNIFICANT CHANGE UP (ref 5–17)
ANION GAP SERPL CALC-SCNC: 14 MMOL/L — SIGNIFICANT CHANGE UP (ref 5–17)
ANION GAP SERPL CALC-SCNC: 14 MMOL/L — SIGNIFICANT CHANGE UP (ref 5–17)
APTT BLD: 27.2 SEC — SIGNIFICANT CHANGE UP (ref 24.5–35.6)
APTT BLD: 27.2 SEC — SIGNIFICANT CHANGE UP (ref 24.5–35.6)
APTT BLD: 29 SEC — SIGNIFICANT CHANGE UP (ref 24.5–35.6)
APTT BLD: 29 SEC — SIGNIFICANT CHANGE UP (ref 24.5–35.6)
AST SERPL-CCNC: 12 U/L — SIGNIFICANT CHANGE UP
AST SERPL-CCNC: 12 U/L — SIGNIFICANT CHANGE UP
AST SERPL-CCNC: 13 U/L — SIGNIFICANT CHANGE UP
AST SERPL-CCNC: 13 U/L — SIGNIFICANT CHANGE UP
BACTERIA # UR AUTO: ABNORMAL /HPF
BACTERIA # UR AUTO: ABNORMAL /HPF
BILIRUB SERPL-MCNC: <0.2 MG/DL — LOW (ref 0.4–2)
BLD GP AB SCN SERPL QL: SIGNIFICANT CHANGE UP
BLD GP AB SCN SERPL QL: SIGNIFICANT CHANGE UP
BUN SERPL-MCNC: 4.3 MG/DL — LOW (ref 8–20)
BUN SERPL-MCNC: 4.3 MG/DL — LOW (ref 8–20)
BUN SERPL-MCNC: 6.4 MG/DL — LOW (ref 8–20)
BUN SERPL-MCNC: 6.4 MG/DL — LOW (ref 8–20)
CALCIUM SERPL-MCNC: 7.8 MG/DL — LOW (ref 8.4–10.5)
CALCIUM SERPL-MCNC: 7.8 MG/DL — LOW (ref 8.4–10.5)
CALCIUM SERPL-MCNC: 8.5 MG/DL — SIGNIFICANT CHANGE UP (ref 8.4–10.5)
CALCIUM SERPL-MCNC: 8.5 MG/DL — SIGNIFICANT CHANGE UP (ref 8.4–10.5)
CHLORIDE SERPL-SCNC: 102 MMOL/L — SIGNIFICANT CHANGE UP (ref 96–108)
CHLORIDE SERPL-SCNC: 102 MMOL/L — SIGNIFICANT CHANGE UP (ref 96–108)
CHLORIDE SERPL-SCNC: 99 MMOL/L — SIGNIFICANT CHANGE UP (ref 96–108)
CHLORIDE SERPL-SCNC: 99 MMOL/L — SIGNIFICANT CHANGE UP (ref 96–108)
CO2 SERPL-SCNC: 22 MMOL/L — SIGNIFICANT CHANGE UP (ref 22–29)
CO2 SERPL-SCNC: 22 MMOL/L — SIGNIFICANT CHANGE UP (ref 22–29)
CO2 SERPL-SCNC: 24 MMOL/L — SIGNIFICANT CHANGE UP (ref 22–29)
CO2 SERPL-SCNC: 24 MMOL/L — SIGNIFICANT CHANGE UP (ref 22–29)
CREAT ?TM UR-MCNC: 326 MG/DL — SIGNIFICANT CHANGE UP
CREAT ?TM UR-MCNC: 326 MG/DL — SIGNIFICANT CHANGE UP
CREAT SERPL-MCNC: 0.51 MG/DL — SIGNIFICANT CHANGE UP (ref 0.5–1.3)
EGFR: 127 ML/MIN/1.73M2 — SIGNIFICANT CHANGE UP
ESTIMATED AVERAGE GLUCOSE: 97 MG/DL — SIGNIFICANT CHANGE UP (ref 68–114)
ESTIMATED AVERAGE GLUCOSE: 97 MG/DL — SIGNIFICANT CHANGE UP (ref 68–114)
FIBRINOGEN PPP-MCNC: 516 MG/DL — HIGH (ref 200–450)
FIBRINOGEN PPP-MCNC: 516 MG/DL — HIGH (ref 200–450)
FIBRINOGEN PPP-MCNC: 566 MG/DL — HIGH (ref 200–450)
FIBRINOGEN PPP-MCNC: 566 MG/DL — HIGH (ref 200–450)
GLUCOSE SERPL-MCNC: 152 MG/DL — HIGH (ref 70–99)
GLUCOSE SERPL-MCNC: 152 MG/DL — HIGH (ref 70–99)
GLUCOSE SERPL-MCNC: 82 MG/DL — SIGNIFICANT CHANGE UP (ref 70–99)
GLUCOSE SERPL-MCNC: 82 MG/DL — SIGNIFICANT CHANGE UP (ref 70–99)
HBV SURFACE AG SERPL QL IA: SIGNIFICANT CHANGE UP
HBV SURFACE AG SERPL QL IA: SIGNIFICANT CHANGE UP
HCT VFR BLD CALC: 27.7 % — LOW (ref 34.5–45)
HCT VFR BLD CALC: 27.7 % — LOW (ref 34.5–45)
HGB BLD-MCNC: 9.1 G/DL — LOW (ref 11.5–15.5)
HGB BLD-MCNC: 9.1 G/DL — LOW (ref 11.5–15.5)
HIV 1 & 2 AB SERPL IA.RAPID: SIGNIFICANT CHANGE UP
HIV 1 & 2 AB SERPL IA.RAPID: SIGNIFICANT CHANGE UP
HIV 1+2 AB+HIV1 P24 AG SERPL QL IA: SIGNIFICANT CHANGE UP
HIV 1+2 AB+HIV1 P24 AG SERPL QL IA: SIGNIFICANT CHANGE UP
INR BLD: 0.96 RATIO — SIGNIFICANT CHANGE UP (ref 0.85–1.18)
INR BLD: 0.96 RATIO — SIGNIFICANT CHANGE UP (ref 0.85–1.18)
INR BLD: 1.02 RATIO — SIGNIFICANT CHANGE UP (ref 0.85–1.18)
INR BLD: 1.02 RATIO — SIGNIFICANT CHANGE UP (ref 0.85–1.18)
LDH SERPL L TO P-CCNC: 165 U/L — SIGNIFICANT CHANGE UP (ref 98–192)
LDH SERPL L TO P-CCNC: 165 U/L — SIGNIFICANT CHANGE UP (ref 98–192)
MAGNESIUM SERPL-MCNC: 4.4 MG/DL — HIGH (ref 1.6–2.6)
MAGNESIUM SERPL-MCNC: 4.4 MG/DL — HIGH (ref 1.6–2.6)
MAGNESIUM SERPL-MCNC: 4.9 MG/DL — HIGH (ref 1.6–2.6)
MCHC RBC-ENTMCNC: 26.9 PG — LOW (ref 27–34)
MCHC RBC-ENTMCNC: 26.9 PG — LOW (ref 27–34)
MCHC RBC-ENTMCNC: 32.9 GM/DL — SIGNIFICANT CHANGE UP (ref 32–36)
MCHC RBC-ENTMCNC: 32.9 GM/DL — SIGNIFICANT CHANGE UP (ref 32–36)
MCV RBC AUTO: 82 FL — SIGNIFICANT CHANGE UP (ref 80–100)
MCV RBC AUTO: 82 FL — SIGNIFICANT CHANGE UP (ref 80–100)
MEV IGG SER-ACNC: >300 AU/ML — SIGNIFICANT CHANGE UP
MEV IGG SER-ACNC: >300 AU/ML — SIGNIFICANT CHANGE UP
MEV IGG+IGM SER-IMP: POSITIVE — SIGNIFICANT CHANGE UP
MEV IGG+IGM SER-IMP: POSITIVE — SIGNIFICANT CHANGE UP
PLATELET # BLD AUTO: 250 K/UL — SIGNIFICANT CHANGE UP (ref 150–400)
PLATELET # BLD AUTO: 250 K/UL — SIGNIFICANT CHANGE UP (ref 150–400)
POTASSIUM SERPL-MCNC: 3.9 MMOL/L — SIGNIFICANT CHANGE UP (ref 3.5–5.3)
POTASSIUM SERPL-MCNC: 3.9 MMOL/L — SIGNIFICANT CHANGE UP (ref 3.5–5.3)
POTASSIUM SERPL-MCNC: 4.3 MMOL/L — SIGNIFICANT CHANGE UP (ref 3.5–5.3)
POTASSIUM SERPL-MCNC: 4.3 MMOL/L — SIGNIFICANT CHANGE UP (ref 3.5–5.3)
POTASSIUM SERPL-SCNC: 3.9 MMOL/L — SIGNIFICANT CHANGE UP (ref 3.5–5.3)
POTASSIUM SERPL-SCNC: 3.9 MMOL/L — SIGNIFICANT CHANGE UP (ref 3.5–5.3)
POTASSIUM SERPL-SCNC: 4.3 MMOL/L — SIGNIFICANT CHANGE UP (ref 3.5–5.3)
POTASSIUM SERPL-SCNC: 4.3 MMOL/L — SIGNIFICANT CHANGE UP (ref 3.5–5.3)
PROT ?TM UR-MCNC: 32 MG/DL — HIGH (ref 0–12)
PROT ?TM UR-MCNC: 32 MG/DL — HIGH (ref 0–12)
PROT SERPL-MCNC: 6.4 G/DL — LOW (ref 6.6–8.7)
PROT/CREAT UR-RTO: 0.1 RATIO — SIGNIFICANT CHANGE UP
PROT/CREAT UR-RTO: 0.1 RATIO — SIGNIFICANT CHANGE UP
PROTHROM AB SERPL-ACNC: 10.7 SEC — SIGNIFICANT CHANGE UP (ref 9.5–13)
PROTHROM AB SERPL-ACNC: 10.7 SEC — SIGNIFICANT CHANGE UP (ref 9.5–13)
PROTHROM AB SERPL-ACNC: 11.3 SEC — SIGNIFICANT CHANGE UP (ref 9.5–13)
PROTHROM AB SERPL-ACNC: 11.3 SEC — SIGNIFICANT CHANGE UP (ref 9.5–13)
RBC # BLD: 3.38 M/UL — LOW (ref 3.8–5.2)
RBC # BLD: 3.38 M/UL — LOW (ref 3.8–5.2)
RBC # FLD: 12.2 % — SIGNIFICANT CHANGE UP (ref 10.3–14.5)
RBC # FLD: 12.2 % — SIGNIFICANT CHANGE UP (ref 10.3–14.5)
RBC CASTS # UR COMP ASSIST: 2 /HPF — SIGNIFICANT CHANGE UP (ref 0–4)
RBC CASTS # UR COMP ASSIST: 2 /HPF — SIGNIFICANT CHANGE UP (ref 0–4)
RUBV IGG SER-ACNC: 2.8 INDEX — SIGNIFICANT CHANGE UP
RUBV IGG SER-ACNC: 2.8 INDEX — SIGNIFICANT CHANGE UP
RUBV IGG SER-IMP: POSITIVE — SIGNIFICANT CHANGE UP
RUBV IGG SER-IMP: POSITIVE — SIGNIFICANT CHANGE UP
SODIUM SERPL-SCNC: 135 MMOL/L — SIGNIFICANT CHANGE UP (ref 135–145)
SODIUM SERPL-SCNC: 135 MMOL/L — SIGNIFICANT CHANGE UP (ref 135–145)
SODIUM SERPL-SCNC: 137 MMOL/L — SIGNIFICANT CHANGE UP (ref 135–145)
SODIUM SERPL-SCNC: 137 MMOL/L — SIGNIFICANT CHANGE UP (ref 135–145)
SQUAMOUS # UR AUTO: 11 /HPF — HIGH (ref 0–5)
SQUAMOUS # UR AUTO: 11 /HPF — HIGH (ref 0–5)
T PALLIDUM AB TITR SER: NEGATIVE — SIGNIFICANT CHANGE UP
T PALLIDUM AB TITR SER: NEGATIVE — SIGNIFICANT CHANGE UP
URATE SERPL-MCNC: 4.2 MG/DL — SIGNIFICANT CHANGE UP (ref 2.4–5.7)
URATE SERPL-MCNC: 4.2 MG/DL — SIGNIFICANT CHANGE UP (ref 2.4–5.7)
WBC # BLD: 8.18 K/UL — SIGNIFICANT CHANGE UP (ref 3.8–10.5)
WBC # BLD: 8.18 K/UL — SIGNIFICANT CHANGE UP (ref 3.8–10.5)
WBC # FLD AUTO: 8.18 K/UL — SIGNIFICANT CHANGE UP (ref 3.8–10.5)
WBC # FLD AUTO: 8.18 K/UL — SIGNIFICANT CHANGE UP (ref 3.8–10.5)
WBC UR QL: 26 /HPF — HIGH (ref 0–5)
WBC UR QL: 26 /HPF — HIGH (ref 0–5)

## 2023-11-10 PROCEDURE — 76816 OB US FOLLOW-UP PER FETUS: CPT | Mod: 26

## 2023-11-10 RX ORDER — ACETAMINOPHEN 500 MG
1000 TABLET ORAL ONCE
Refills: 0 | Status: COMPLETED | OUTPATIENT
Start: 2023-11-10 | End: 2023-11-10

## 2023-11-10 RX ORDER — INDOMETHACIN 50 MG
50 CAPSULE ORAL ONCE
Refills: 0 | Status: COMPLETED | OUTPATIENT
Start: 2023-11-10 | End: 2023-11-10

## 2023-11-10 RX ORDER — SODIUM CHLORIDE 9 MG/ML
1000 INJECTION, SOLUTION INTRAVENOUS
Refills: 0 | Status: DISCONTINUED | OUTPATIENT
Start: 2023-11-10 | End: 2023-11-12

## 2023-11-10 RX ORDER — MAGNESIUM SULFATE 500 MG/ML
4 VIAL (ML) INJECTION ONCE
Refills: 0 | Status: COMPLETED | OUTPATIENT
Start: 2023-11-10 | End: 2023-11-09

## 2023-11-10 RX ORDER — LABETALOL HCL 100 MG
300 TABLET ORAL THREE TIMES A DAY
Refills: 0 | Status: DISCONTINUED | OUTPATIENT
Start: 2023-11-10 | End: 2023-11-11

## 2023-11-10 RX ORDER — INDOMETHACIN 50 MG
25 CAPSULE ORAL EVERY 6 HOURS
Refills: 0 | Status: DISCONTINUED | OUTPATIENT
Start: 2023-11-10 | End: 2023-11-11

## 2023-11-10 RX ORDER — MAGNESIUM SULFATE 500 MG/ML
2 VIAL (ML) INJECTION
Qty: 40 | Refills: 0 | Status: DISCONTINUED | OUTPATIENT
Start: 2023-11-10 | End: 2023-11-12

## 2023-11-10 RX ORDER — ONDANSETRON 8 MG/1
8 TABLET, FILM COATED ORAL ONCE
Refills: 0 | Status: DISCONTINUED | OUTPATIENT
Start: 2023-11-10 | End: 2023-11-12

## 2023-11-10 RX ADMIN — Medication 50 MILLIGRAM(S): at 05:10

## 2023-11-10 RX ADMIN — Medication 300 MILLIGRAM(S): at 17:52

## 2023-11-10 RX ADMIN — Medication 25 MILLIGRAM(S): at 12:16

## 2023-11-10 RX ADMIN — Medication 50 GM/HR: at 17:49

## 2023-11-10 RX ADMIN — Medication 50 GM/HR: at 00:15

## 2023-11-10 RX ADMIN — Medication 1 TABLET(S): at 12:16

## 2023-11-10 RX ADMIN — Medication 1000 MILLIGRAM(S): at 05:10

## 2023-11-10 RX ADMIN — Medication 25 MILLIGRAM(S): at 17:49

## 2023-11-10 RX ADMIN — SODIUM CHLORIDE 75 MILLILITER(S): 9 INJECTION, SOLUTION INTRAVENOUS at 17:48

## 2023-11-10 RX ADMIN — Medication 12 MILLIGRAM(S): at 00:17

## 2023-11-10 RX ADMIN — Medication 300 MILLIGRAM(S): at 10:46

## 2023-11-10 RX ADMIN — Medication 400 MILLIGRAM(S): at 05:10

## 2023-11-10 RX ADMIN — Medication 25 MILLIGRAM(S): at 13:16

## 2023-11-10 NOTE — CHART NOTE - NSCHARTNOTEFT_GEN_A_CORE
Patient endorsing occasional CTX.  SVE 0/0/-3  Abd nontender, no incisional pain.     FHT: baseline 130, moderate variability, +accels, -decels   [place]: CTX q2min    Indocin 50mg given with 25mg q6hr for 48 hours  IVF bolus    Discussed with Dr. Iraheta

## 2023-11-10 NOTE — CONSULT NOTE ADULT - PROBLEM SELECTOR RECOMMENDATION 3
- Patient endorses LLQ, decline tylenol at this time. Nontender on PE  - CTX q2min  - IVF bolus  - FU UA, UCx  - FU TVUS for CL and FFN - Patient endorses LLQ, decline tylenol at this time. Nontender on PE  - CTX q2min  - IVF bolus  - CL on bedside TVUS 3.7cm  - SSE: Cervix closed on visualization  - UA neg  - FU UCx

## 2023-11-10 NOTE — CONSULT NOTE ADULT - ATTENDING COMMENTS
Discussed plan of care with Dr. Castro. Agree with assessment and plan documented above. CHTN exacerbation versus superimposed preeclampsia. Continue to monitor closely.

## 2023-11-10 NOTE — OB RN PATIENT PROFILE - FALL HARM RISK - UNIVERSAL INTERVENTIONS
Bed in lowest position, wheels locked, appropriate side rails in place/Call bell, personal items and telephone in reach/Instruct patient to call for assistance before getting out of bed or chair/Non-slip footwear when patient is out of bed/[place] to call system/Physically safe environment - no spills, clutter or unnecessary equipment/Purposeful Proactive Rounding/Room/bathroom lighting operational, light cord in reach

## 2023-11-10 NOTE — CONSULT NOTE ADULT - SUBJECTIVE AND OBJECTIVE BOX
33y  at 24w5d GA by LMP 23 who presents to L&D for L sided pain, fatigue and elevated BP to 160s at home. Patient has a hx of cHTN on labetalol 200mg BID and checks BPs daily at home. BPs usually 120-130s, however the past two days have been 160s. Patient states takes labetalol at 10AM/10PM, took morning dose, due for PM dose. Patient also endorses 2 episodes of vomiting today. Patient denies vaginal bleeding, contractions and leakage of fluid. She endorses good fetal movement. Patient denies HA, visual changes and epigastric pain. Denies fevers, chills, nausea, vomiting, chest pain, SOB, dizziness and headache. No other complaints at this time.     LMP: 2023  MAGALY: 2024    Prenatal course complicated by:  1. Limited PNC - 2 OB visits this pregnancy  2. cHTN, on labetalol 200mg BID  3. Hx of uterine rupture  4. Hx of CSx2    POB:   G1: pCS (; failed IOL, complicated by uterine rupture)  G2: rCS () complicated by gHTN  PGYN: -fibroids, -ovarian cysts, denies STD hx, denies abnormal PAPs   PMH: cHTN, anxiety  PSH: CS x2, gastric sleeve (), cholecystectomy (), excision of pilonidal cyst  SH: Denies EtOH, tobacco and illicit drug use during this pregnancy  Meds: PNVs, labetalol 200mg BID  Allergies: NKDA    T(C): 37 (23 @ 22:24), Max: 37 (23 @ 22:24)  HR: 94 (23 @ 23:53) (87 - 111)  BP: 143/67 (23 @ 23:53) (136/83 - 165/80)  RR: 16 (23 @ 22:24) (16 - 16)  SpO2: 98% (23 @ 23:53) (90% - 100%)    Gen: NAD, well-appearing, AAOx3   Abd: Soft, gravid, nontender  Ext: non-tender, non-edematous    FHT: baseline 140, moderate variability, +accels, -decels   [place]: CTX q2min    LABS: pending 33y  at 24w5d GA by LMP 23 who presents to L&D for L sided pain, fatigue and elevated BP to 160s at home. Patient has a hx of cHTN on labetalol 200mg BID and checks BPs daily at home. BPs usually 120-130s, however the past two days have been 160s. Patient states takes labetalol at 10AM/10PM, took morning dose, due for PM dose. Patient also endorses 2 episodes of vomiting today. Patient denies vaginal bleeding, contractions and leakage of fluid. She endorses good fetal movement. Patient denies HA, visual changes and epigastric pain. Denies fevers, chills, nausea, vomiting, chest pain, SOB, dizziness and headache. No other complaints at this time.     LMP: 2023  MAGALY: 2024    Prenatal course complicated by:  1. Limited PNC - 2 OB visits this pregnancy  2. cHTN, on labetalol 200mg BID  3. Hx of uterine rupture  4. Hx of CSx2    POB:   G1: pCS (; failed IOL, complicated by uterine rupture)  G2: rCS () complicated by gHTN  PGYN: -fibroids, -ovarian cysts, denies STD hx, denies abnormal PAPs   PMH: cHTN, anxiety  PSH: CS x2, gastric sleeve (), cholecystectomy (), excision of pilonidal cyst  SH: Denies EtOH, tobacco and illicit drug use during this pregnancy  Meds: PNVs, labetalol 200mg BID  Allergies: NKDA    T(C): 37 (23 @ 22:24), Max: 37 (23 @ 22:24)  HR: 94 (23 @ 23:53) (87 - 111)  BP: 143/67 (23 @ 23:53) (136/83 - 165/80)  RR: 16 (23 @ 22:24) (16 - 16)  SpO2: 98% (23 @ 23:53) (90% - 100%)    Gen: NAD, well-appearing, AAOx3   Abd: Soft, gravid, nontender  Ext: non-tender, non-edematous  SSE: Cervix closed on visualization    FHT: baseline 140, moderate variability, +accels, -decels   [place]: CTX q2min    Bedside TVUS: CL 3.7cm    LABS:                       9.5    9.09  )-----------( 273      ( 2023 23:40 )             29.5       135  |  99  |  6.4<L>  ----------------------------<  82  3.9   |  24.0  |  0.51    Ca    8.5      2023 23:40    TPro  6.4<L>  /  Alb  3.4  /  TBili  <0.2<L>  /  DBili  x   /  AST  13  /  ALT  10  /  AlkPhos  73  11-09    Urinalysis Basic - ( 2023 23:40 )    Color: Dark Yellow / Appearance: Cloudy / S.028 / pH: x  Gluc: 82 mg/dL / Ketone: Trace mg/dL  / Bili: Negative / Urobili: 1.0 mg/dL   Blood: x / Protein: 30 mg/dL / Nitrite: Negative   Leuk Esterase: Small / RBC: 2 /HPF / WBC 26 /HPF   Sq Epi: x / Non Sq Epi: 11 /HPF / Bacteria: Many /HPF

## 2023-11-10 NOTE — CONSULT NOTE ADULT - PROBLEM SELECTOR RECOMMENDATION 9
- Limited PNC, only seen x2 in this pregnancy  - FU PNL, A1c  - FU MFM US  - FHT reassuring  - Daily PNV - Limited PNC, only seen x2 in this pregnancy  - FU PNL  - A1c 5.0  - FU MFM US  - FHT reassuring  - Daily PNV

## 2023-11-10 NOTE — CONSULT NOTE ADULT - ASSESSMENT
33y  at 24w5d GA by LMP 23 who is admitted for cHTN exacerbation vs cHTN with superimposed PEC.    Discussed with Dr. Castro

## 2023-11-10 NOTE — CONSULT NOTE ADULT - NSCONSULTADDITIONALINFOA_GEN_ALL_CORE
MFM Fellow addendum    I discussed this patient with the resident as well as the  Dr Cain and the M attending Dr Logan. In summary she is a  with cHTN exacerbation vs SI pree with SF. Labs were normal this evaluation and her blood pressures normalized after initiation of her home medication. Recommended 24 hours of magnesium for seizure prophylaxis, and betamethasone for fetal lung maturity.     At the time of initial consultation patient was stable enough to remain at [facility] for monitoring, however if status were to worsen do not hesitate to reach out to [facility] to discuss whether transfer to [facility] would be warranted for fetal benefit.     Kehinde Castro DO  M Fellow

## 2023-11-10 NOTE — OB RN PATIENT PROFILE - FALL HARM RISK - CONCLUSION
Cardiovascular Surgery Progress Note    Name: Guerrero Ann  MRN: [id number]  : 1970  Admit Date: 2020  7:37 PM  Procedure:  Procedure(s) and Anesthesia Type:     * CABG, WITH ENDOSCOPIC VEIN PROCUREMENT- X3 - General     * ECHOCARDIOGRAM, TRANSESOPHAGEAL - General  7 Day Post-Op    Vitals:  Patient Vitals for the past 8 hrs:   Temp Monitored Temp 2 SpO2 O2 Delivery Device O2 (LPM) Pulse Resp BP Weight   10/02/20 0800 -- -- -- Silicone Nasal Cannula 3 -- -- -- --   10/02/20 0700 -- 38.1 °C (100.6 °F) 95 % -- -- (!) 117 16 106/55 --   10/02/20 0600 -- 38.4 °C (101.1 °F) 98 % -- -- (!) 117 17 138/53 --   10/02/20 0500 -- 39.1 °C (102.4 °F) 97 % -- -- (!) 118 (!) 41 108/82 --   10/02/20 0400 -- 39.5 °C (103.1 °F) 97 % Silicone Nasal Cannula 4 (!) 125 (!) 38 129/76 71.2 kg (156 lb 15.5 oz)   10/02/20 0300 -- 38.2 °C (100.8 °F) 97 % Silicone Nasal Cannula 6 (!) 128 (!) 38 114/70 --   10/02/20 0200 37.7 °C (99.8 °F) -- (!) 82 % None - Room Air 0 (!) 111 (!) 35 (!) 91/60 --   10/02/20 0100 -- -- -- -- -- (!) 118 (!) 27 101/67 --     Temp (24hrs), Av.1 °C (98.8 °F), Min:36.3 °C (97.3 °F), Max:38.8 °C (101.9 °F)    Respiratory:    Respiration: 16, Pulse Oximetry: 95 %     Chest Tube Drains:          Fluids:    Intake/Output Summary (Last 24 hours) at 10/2/2020 0839  Last data filed at 10/2/2020 0800  Gross per 24 hour   Intake 1020 ml   Output 2450 ml   Net -1430 ml     Admit weight: Weight: 72.6 kg (160 lb)  Current weight: Weight: 71.2 kg (156 lb 15.5 oz) (10/02/20 0400)    Labs:  Recent Labs     20  0230 10/01/20  0548 10/01/20  2230   WBC 12.6* 8.5 4.7*   RBC 2.49* 2.68* 2.99*   HEMOGLOBIN 8.1* 8.5* 9.5*   HEMATOCRIT 24.2* 26.2* 29.1*   MCV 97.2 97.8 97.3   MCH 32.5 31.7 31.8   MCHC 33.5* 32.4* 32.6*   RDW 49.1 50.2* 48.9   PLATELETCT 247 317 400   MPV 10.7 10.9 10.9     Recent Labs     20  0230 10/01/20  0548 10/01/20  2230   NEUTSPOLYS 70.10 61.70 69.60   LYMPHOCYTES  15.70* 17.40* 14.80*   MONOCYTES 11.20 14.80* 9.60   EOSINOPHILS 1.90 1.70 1.70   BASOPHILS 0.60 0.90 0.00   RBCMORPHOLO  --  Present Present     Recent Labs     09/30/20  0230 09/30/20  1435 10/01/20  0548 10/01/20  2230   SODIUM 135  --  137 136   POTASSIUM 3.3* 3.8 3.6 3.8   CHLORIDE 96  --  98 96   CO2 19*  --  19* 16*   GLUCOSE 178*  --  159* 201*   BUN 23*  --  34* 62*   CREATININE 1.19  --  1.19 2.37*   CALCIUM 8.7  --  9.1 9.2     Medications:  • Fleet Enema  1 Enema     • heparin  5,000 Units     • metoclopramide  10 mg     • bisacodyl  10 mg     • [START ON 10/3/2020] ampicillin-sulbactam (UNASYN) IV  3 g     • carvedilol  25 mg     • Pharmacy  1 Each     • acetaminophen  1,000 mg     • allopurinol  100 mg     • aspirin  81 mg     • atorvastatin  80 mg     • buPROPion  100 mg     • omeprazole  40 mg     • senna-docusate  2 Tab      And   • polyethylene glycol/lytes  1 Packet      And   • magnesium hydroxide  30 mL     • clopidogrel  75 mg     • potassium chloride SA  40 mEq     • insulin lispro  4 Units     • insulin lispro  0-15 Units     • MD Alert...Adult ICU Electrolyte Replacement per Pharmacy       • lidocaine  1 Patch     • Pharmacy Consult Request  1 Each        Ordered Medications:    ASA - Yes    Plavix - Yes    Post-operative Beta Blockers - Yes    Ace Inhibitor - No; contraindicated because of Increased creatinine/CKD    Statin - Yes    Exam:   Review of Systems   Unable to perform ROS: Mental status change     Physical Exam  Constitutional:       General: He is not in acute distress.     Appearance: He is well-developed. He is not diaphoretic.   Neck:      Musculoskeletal: Neck supple.   Cardiovascular:      Rate and Rhythm: Normal rate and regular rhythm.      Heart sounds: Normal heart sounds. No murmur. No friction rub. No gallop.    Pulmonary:      Effort: Pulmonary effort is normal. No respiratory distress.      Breath sounds: Examination of the right-lower field reveals decreased breath  sounds. Examination of the left-lower field reveals decreased breath sounds. Decreased breath sounds present. No wheezing or rales.   Abdominal:      General: There is no distension.      Palpations: Abdomen is soft.      Tenderness: There is no abdominal tenderness.   Skin:     General: Skin is warm and dry.      Comments: Sternum- prevena dressing  SVG sites- c/d/i   Neurological:      Mental Status: He is alert and oriented to person, place, and time.       Quality Measures:   Quality-Core Measures   Reviewed items::  EKG reviewed, Labs reviewed, Medications reviewed and Radiology images reviewed  Central line in place:  Concentrated IV drugs  DVT prophylaxis pharmacological::  Heparin  DVT prophylaxis - mechanical:  SCDs  Ulcer Prophylaxis::  Yes    Assessment/Plan:  POD 1 HDS, SR, Acute blood loss anemia- s/p transfusion- keep mediastinal tubes, gently diurese, d/c gomez  POD 2 HTN- inc lisinopril, SR/ST- change to metoprolol, acute blood loss anemia s/p transfusion- watch, d/c mediastinal tubes, urine retention- replace gomez/start flomax, cont diuresis  POD 3  HDS, SR, neuro right  weaker than left gazes to right, wounds CDI, abdomen S/NT, fluid balance negative, wt still up from admit.  Plan:  MRI today, decrease ace for permissive hypertension, dc gabapentin, BB, Statin, IS/ambulate. CPM.  POD 4  HDS but hypertensive, SR, neuro answers questions but delayed.  Gaze more center.  EEG normal, MRI pending, wounds CDI, abdomen distended, fluid balance negative, wt down.  Plan:  Restart ace, IM getting abdominal study, BB, Statin, IS/ambulate. CPM.  POD 5  HDS, ST, neuro unchanged, wounds CDI, abdomen S/NT, fluid balance negative, wt down.  Failed swallow evaluation.  Hypertensive.  Pain issues.  Plan:  core trak, switch BB to coreg, ACE, Statin, add norco, IS/ambulate. CPM.  POD 6  HDS, SR, neuro unchanged, wounds CDI, abdomen S/NT, fluid balance negative, wt stable.  Plan:  Awaiting rehab authorization, JOHN,  ACE, Statin, IS/ambulate. CPM.  POD 7 HDS, SR, Febrile- most likely aspiration pneumonia- started on rocephin- d/w pharmacy will change to unasyn, ileus- 2 liters of residual last night- start reglan/supp- if no response try relistor, Elevated creatinine- stop lisinopril/lasix- switch lovenox to heparin- start fluids 75 cc/hr, replace gomez for strict I&O, neuro- follows with slowed response, minimal speech    Active Hospital Problems    Diagnosis   • Abdominal pain [R10.9]     Priority: High   • Altered mental status [R41.82]     Priority: High   • Encounter for weaning from ventilator ([facility]) [Z99.11]     Priority: High   • NSTEMI (non-ST elevated myocardial infarction) ([facility]) [I21.4]     Priority: High   • CAD (coronary artery disease) [I25.10]     Priority: Medium   • Type 2 diabetes mellitus with complication, without long-term current use of insulin ([facility]) [E11.8]     Priority: Medium   • Essential hypertension [I10]     Priority: Medium   • Normochromic normocytic anemia [D64.9]     Priority: Low   • Lacunar infarct, acute ([facility]) [I63.81]   • Cardioembolic stroke ([facility]) [I63.9]   • SONNY (acute kidney injury) ([facility]) [N17.9]        Universal Safety Interventions

## 2023-11-10 NOTE — CONSULT NOTE ADULT - PROBLEM SELECTOR RECOMMENDATION 2
- Hx of cHTN on labetalol 200mg BID (10AM/10PM)  - Elevated BPs 140-160s in triage, PM dose of standing labetalol 200mg PO given  - MgSO4 started for maternal seizure ppx for 24hrs  - Betamethasone 12mg q24hrs for 2 doses for fetal lung maturity  - PIH labs pending - Hx of cHTN on labetalol 200mg BID (10AM/10PM)  - Elevated BPs 140-160s in triage, PM dose of standing labetalol 200mg PO given  - MgSO4 started for maternal seizure ppx for 24hrs  - Betamethasone 12mg q24hrs for 2 doses for fetal lung maturity  - PIH labs wnl

## 2023-11-11 ENCOUNTER — TRANSCRIPTION ENCOUNTER (OUTPATIENT)
Age: 33
End: 2023-11-11

## 2023-11-11 LAB
ALBUMIN SERPL ELPH-MCNC: 3.4 G/DL — SIGNIFICANT CHANGE UP (ref 3.3–5.2)
ALBUMIN SERPL ELPH-MCNC: 3.4 G/DL — SIGNIFICANT CHANGE UP (ref 3.3–5.2)
ALP SERPL-CCNC: 72 U/L — SIGNIFICANT CHANGE UP (ref 40–120)
ALP SERPL-CCNC: 72 U/L — SIGNIFICANT CHANGE UP (ref 40–120)
ALT FLD-CCNC: 9 U/L — SIGNIFICANT CHANGE UP
ALT FLD-CCNC: 9 U/L — SIGNIFICANT CHANGE UP
ANION GAP SERPL CALC-SCNC: 11 MMOL/L — SIGNIFICANT CHANGE UP (ref 5–17)
ANION GAP SERPL CALC-SCNC: 11 MMOL/L — SIGNIFICANT CHANGE UP (ref 5–17)
AST SERPL-CCNC: 12 U/L — SIGNIFICANT CHANGE UP
AST SERPL-CCNC: 12 U/L — SIGNIFICANT CHANGE UP
BILIRUB SERPL-MCNC: <0.2 MG/DL — LOW (ref 0.4–2)
BILIRUB SERPL-MCNC: <0.2 MG/DL — LOW (ref 0.4–2)
BUN SERPL-MCNC: 8.1 MG/DL — SIGNIFICANT CHANGE UP (ref 8–20)
BUN SERPL-MCNC: 8.1 MG/DL — SIGNIFICANT CHANGE UP (ref 8–20)
C TRACH RRNA SPEC QL NAA+PROBE: SIGNIFICANT CHANGE UP
C TRACH RRNA SPEC QL NAA+PROBE: SIGNIFICANT CHANGE UP
CALCIUM SERPL-MCNC: 8.3 MG/DL — LOW (ref 8.4–10.5)
CALCIUM SERPL-MCNC: 8.3 MG/DL — LOW (ref 8.4–10.5)
CANDIDA AB TITR SER: DETECTED
CANDIDA AB TITR SER: DETECTED
CHLORIDE SERPL-SCNC: 105 MMOL/L — SIGNIFICANT CHANGE UP (ref 96–108)
CHLORIDE SERPL-SCNC: 105 MMOL/L — SIGNIFICANT CHANGE UP (ref 96–108)
CO2 SERPL-SCNC: 23 MMOL/L — SIGNIFICANT CHANGE UP (ref 22–29)
CO2 SERPL-SCNC: 23 MMOL/L — SIGNIFICANT CHANGE UP (ref 22–29)
CREAT SERPL-MCNC: 0.58 MG/DL — SIGNIFICANT CHANGE UP (ref 0.5–1.3)
CREAT SERPL-MCNC: 0.58 MG/DL — SIGNIFICANT CHANGE UP (ref 0.5–1.3)
CULTURE RESULTS: SIGNIFICANT CHANGE UP
CULTURE RESULTS: SIGNIFICANT CHANGE UP
EGFR: 123 ML/MIN/1.73M2 — SIGNIFICANT CHANGE UP
EGFR: 123 ML/MIN/1.73M2 — SIGNIFICANT CHANGE UP
G VAGINALIS DNA SPEC QL NAA+PROBE: SIGNIFICANT CHANGE UP
G VAGINALIS DNA SPEC QL NAA+PROBE: SIGNIFICANT CHANGE UP
GLUCOSE SERPL-MCNC: 111 MG/DL — HIGH (ref 70–99)
GLUCOSE SERPL-MCNC: 111 MG/DL — HIGH (ref 70–99)
GROUP B BETA STREP DNA (PCR): SIGNIFICANT CHANGE UP
GROUP B BETA STREP DNA (PCR): SIGNIFICANT CHANGE UP
HCT VFR BLD CALC: 27.4 % — LOW (ref 34.5–45)
HCT VFR BLD CALC: 27.4 % — LOW (ref 34.5–45)
HGB BLD-MCNC: 8.9 G/DL — LOW (ref 11.5–15.5)
HGB BLD-MCNC: 8.9 G/DL — LOW (ref 11.5–15.5)
MCHC RBC-ENTMCNC: 27.4 PG — SIGNIFICANT CHANGE UP (ref 27–34)
MCHC RBC-ENTMCNC: 27.4 PG — SIGNIFICANT CHANGE UP (ref 27–34)
MCHC RBC-ENTMCNC: 32.5 GM/DL — SIGNIFICANT CHANGE UP (ref 32–36)
MCHC RBC-ENTMCNC: 32.5 GM/DL — SIGNIFICANT CHANGE UP (ref 32–36)
MCV RBC AUTO: 84.3 FL — SIGNIFICANT CHANGE UP (ref 80–100)
MCV RBC AUTO: 84.3 FL — SIGNIFICANT CHANGE UP (ref 80–100)
N GONORRHOEA RRNA SPEC QL NAA+PROBE: SIGNIFICANT CHANGE UP
N GONORRHOEA RRNA SPEC QL NAA+PROBE: SIGNIFICANT CHANGE UP
PLATELET # BLD AUTO: 298 K/UL — SIGNIFICANT CHANGE UP (ref 150–400)
PLATELET # BLD AUTO: 298 K/UL — SIGNIFICANT CHANGE UP (ref 150–400)
POTASSIUM SERPL-MCNC: 4.6 MMOL/L — SIGNIFICANT CHANGE UP (ref 3.5–5.3)
POTASSIUM SERPL-MCNC: 4.6 MMOL/L — SIGNIFICANT CHANGE UP (ref 3.5–5.3)
POTASSIUM SERPL-SCNC: 4.6 MMOL/L — SIGNIFICANT CHANGE UP (ref 3.5–5.3)
POTASSIUM SERPL-SCNC: 4.6 MMOL/L — SIGNIFICANT CHANGE UP (ref 3.5–5.3)
PROT SERPL-MCNC: 6.1 G/DL — LOW (ref 6.6–8.7)
PROT SERPL-MCNC: 6.1 G/DL — LOW (ref 6.6–8.7)
RBC # BLD: 3.25 M/UL — LOW (ref 3.8–5.2)
RBC # BLD: 3.25 M/UL — LOW (ref 3.8–5.2)
RBC # FLD: 12.3 % — SIGNIFICANT CHANGE UP (ref 10.3–14.5)
RBC # FLD: 12.3 % — SIGNIFICANT CHANGE UP (ref 10.3–14.5)
SODIUM SERPL-SCNC: 138 MMOL/L — SIGNIFICANT CHANGE UP (ref 135–145)
SODIUM SERPL-SCNC: 138 MMOL/L — SIGNIFICANT CHANGE UP (ref 135–145)
SOURCE GROUP B STREP: SIGNIFICANT CHANGE UP
SOURCE GROUP B STREP: SIGNIFICANT CHANGE UP
SPECIMEN SOURCE: SIGNIFICANT CHANGE UP
T VAGINALIS SPEC QL WET PREP: SIGNIFICANT CHANGE UP
T VAGINALIS SPEC QL WET PREP: SIGNIFICANT CHANGE UP
WBC # BLD: 12.49 K/UL — HIGH (ref 3.8–10.5)
WBC # BLD: 12.49 K/UL — HIGH (ref 3.8–10.5)
WBC # FLD AUTO: 12.49 K/UL — HIGH (ref 3.8–10.5)
WBC # FLD AUTO: 12.49 K/UL — HIGH (ref 3.8–10.5)

## 2023-11-11 PROCEDURE — 99232 SBSQ HOSP IP/OBS MODERATE 35: CPT | Mod: GC

## 2023-11-11 RX ORDER — INDOMETHACIN 50 MG
25 CAPSULE ORAL EVERY 6 HOURS
Refills: 0 | Status: COMPLETED | OUTPATIENT
Start: 2023-11-11 | End: 2023-11-12

## 2023-11-11 RX ORDER — LABETALOL HCL 100 MG
200 TABLET ORAL EVERY 8 HOURS
Refills: 0 | Status: DISCONTINUED | OUTPATIENT
Start: 2023-11-11 | End: 2023-11-12

## 2023-11-11 RX ORDER — LABETALOL HCL 100 MG
300 TABLET ORAL EVERY 8 HOURS
Refills: 0 | Status: DISCONTINUED | OUTPATIENT
Start: 2023-11-11 | End: 2023-11-11

## 2023-11-11 RX ORDER — ACETAMINOPHEN 500 MG
975 TABLET ORAL ONCE
Refills: 0 | Status: COMPLETED | OUTPATIENT
Start: 2023-11-11 | End: 2023-11-11

## 2023-11-11 RX ADMIN — Medication 25 MILLIGRAM(S): at 00:30

## 2023-11-11 RX ADMIN — Medication 25 MILLIGRAM(S): at 00:02

## 2023-11-11 RX ADMIN — Medication 25 MILLIGRAM(S): at 06:57

## 2023-11-11 RX ADMIN — Medication 25 MILLIGRAM(S): at 18:08

## 2023-11-11 RX ADMIN — Medication 25 MILLIGRAM(S): at 12:35

## 2023-11-11 RX ADMIN — Medication 1 TABLET(S): at 12:31

## 2023-11-11 RX ADMIN — Medication 300 MILLIGRAM(S): at 02:17

## 2023-11-11 RX ADMIN — Medication 200 MILLIGRAM(S): at 15:34

## 2023-11-11 RX ADMIN — Medication 975 MILLIGRAM(S): at 04:15

## 2023-11-11 RX ADMIN — Medication 12 MILLIGRAM(S): at 00:02

## 2023-11-11 RX ADMIN — Medication 975 MILLIGRAM(S): at 03:45

## 2023-11-11 RX ADMIN — Medication 200 MILLIGRAM(S): at 23:35

## 2023-11-11 NOTE — PROGRESS NOTE ADULT - PROBLEM SELECTOR PLAN 2
- Hx of cHTN regimen increased to labetalol 300mg TID   - Normotensive 100-110/50-60s  - S/p MgSO4 for maternal seizure ppx   - S/p Betamethasone 12mg q24hrs for 2 doses for fetal lung maturity  - PIH labs wnl > FU AM PIH labs

## 2023-11-11 NOTE — DISCHARGE NOTE ANTEPARTUM - CARE PROVIDER_API CALL
Rimpel, Katherinne  Obstetrics and Gynecology  [address] [place] [address]  Phone: [phone]  Fax: [phone]  Follow Up Time:

## 2023-11-11 NOTE — DISCHARGE NOTE ANTEPARTUM - MEDICATION SUMMARY - MEDICATIONS TO TAKE
I will START or STAY ON the medications listed below when I get home from the hospital:    labetalol 200 mg oral tablet  -- 1 tab(s) by mouth every 8 hours  -- Indication: For ChTN

## 2023-11-11 NOTE — DISCHARGE NOTE ANTEPARTUM - PLAN OF CARE
1) Take your blood pressure medications as prescribed at home  2) Before taking your medication, take your blood pressure. If your systolic (top number) blood pressure is less than 110 or your heartrate is less than 60, skip that dose  3) Make a follow up appointment with your doctor within a week for a blood pressure check  4) Come back to labor and delivery if you experience a headache that is not relieved with pain medication, changes in your vision, severe abdominal pain in the upper right part of the belly

## 2023-11-11 NOTE — DISCHARGE NOTE ANTEPARTUM - HOSPITAL COURSE
Pt admitted for inpatient management of chronic hypertension exacerbation treated with antihypertensives now s/p BTMZ course. PT BPs stable upon discharge, will follow up with outpatient OBGYN and MFM for high risk care. Pt admitted for inpatient management of chronic hypertension exacerbation treated adjustment of hypertensive therapy. Completed betamethasone. BPs stable upon discharge, will follow up with outpatient OBGYN and MFM for high risk care.

## 2023-11-11 NOTE — DISCHARGE NOTE ANTEPARTUM - CARE PLAN
Principal Discharge DX:	Chronic hypertension  Assessment and plan of treatment:	1) Take your blood pressure medications as prescribed at home  2) Before taking your medication, take your blood pressure. If your systolic (top number) blood pressure is less than 110 or your heartrate is less than 60, skip that dose  3) Make a follow up appointment with your doctor within a week for a blood pressure check  4) Come back to labor and delivery if you experience a headache that is not relieved with pain medication, changes in your vision, severe abdominal pain in the upper right part of the belly   1

## 2023-11-11 NOTE — PROGRESS NOTE ADULT - SUBJECTIVE AND OBJECTIVE BOX
33y  at 25wks GA by LMP 23 who is admitted for cHTN exacerbation vs cHTN with superimposed PEC.    Prenatal course complicated by:  1. Limited PNC - 2 OB visits this pregnancy  2. cHTN, on labetalol 200mg BID  3. Hx of uterine rupture  4. Hx of CSx2    SUBJECTIVE:  Patient feeling well.  Denies CTX, LOF, VB. Endorses good fetal movement.  Denies HA, visual changes, SOB and epigastric pain.    Vital Signs Last 24 Hrs  T(C): 36.8 (10 Nov 2023 12:03), Max: 36.8 (10 Nov 2023 06:35)  T(F): 98.24 (10 Nov 2023 12:03), Max: 98.24 (10 Nov 2023 06:35)  HR: 86 (2023 02:17) (76 - 116)  BP: 106/58 (2023 02:17) (106/58 - 168/83)  RR: 15 (10 Nov 2023 12:03) (15 - 16)  SpO2: 92% (2023 00:09) (89% - 100%)    Gen: AAOx3  Abd: soft, gravid  Ext: no tenderness to calf palpation    FHT: baseline 140, moderate variability, + acels, -decels  [place]: CTX rare     Labs:                        9.1    8.18  )-----------( 250      ( 10 Nov 2023 06:00 )             27.7   11-10    137  |  102  |  4.3<L>  ----------------------------<  152<H>  4.3   |  22.0  |  0.51    Ca    7.8<L>      10 Nov 2023 06:00  Mg     4.9     11-10    TPro  6.4<L>  /  Alb  3.7  /  TBili  <0.2<L>  /  DBili  x   /  AST  12  /  ALT  10  /  AlkPhos  76  11-10   33y  at 25wks GA by LMP 23 who is admitted for cHTN exacerbation vs cHTN with superimposed PEC.    Prenatal course complicated by:  1. Limited PNC - 2 OB visits this pregnancy  2. cHTN, on labetalol 200mg BID  3. Hx of uterine rupture  4. Hx of CSx2    SUBJECTIVE:  Patient feeling well. Endorsing HA overnight, resolved in tylenol. Denies current HA.  Denies CTX, LOF, VB. Endorses good fetal movement.  Denies visual changes, SOB and epigastric pain.    Vital Signs Last 24 Hrs  T(C): 36.8 (10 Nov 2023 12:03), Max: 36.8 (10 Nov 2023 06:35)  T(F): 98.24 (10 Nov 2023 12:03), Max: 98.24 (10 Nov 2023 06:35)  HR: 86 (2023 02:17) (76 - 116)  BP: 106/58 (2023 02:17) (106/58 - 168/83)  RR: 15 (10 Nov 2023 12:03) (15 - 16)  SpO2: 92% (2023 00:09) (89% - 100%)    Gen: AAOx3  Abd: soft, gravid  Ext: no tenderness to calf palpation    FHT: baseline 140, moderate variability, + acels, -decels  [place]: CTX rare     Labs:                        9.1    8.18  )-----------( 250      ( 10 Nov 2023 06:00 )             27.7   11-10    137  |  102  |  4.3<L>  ----------------------------<  152<H>  4.3   |  22.0  |  0.51    Ca    7.8<L>      10 Nov 2023 06:00  Mg     4.9     11-10    TPro  6.4<L>  /  Alb  3.7  /  TBili  <0.2<L>  /  DBili  x   /  AST  12  /  ALT  10  /  AlkPhos  76  11-10

## 2023-11-11 NOTE — DISCHARGE NOTE ANTEPARTUM - PATIENT PORTAL LINK FT
You can access the FollowMyHealth Patient Portal offered by [facility] by registering at the following website: [email or web address]. By joining [employer]’s FollowMyHealth portal, you will also be able to view your health information using other applications (apps) compatible with our system.

## 2023-11-12 VITALS
OXYGEN SATURATION: 99 % | DIASTOLIC BLOOD PRESSURE: 81 MMHG | TEMPERATURE: 98 F | HEART RATE: 89 BPM | SYSTOLIC BLOOD PRESSURE: 145 MMHG | RESPIRATION RATE: 17 BRPM

## 2023-11-12 PROCEDURE — 86900 BLOOD TYPING SEROLOGIC ABO: CPT

## 2023-11-12 PROCEDURE — 85025 COMPLETE CBC W/AUTO DIFF WBC: CPT

## 2023-11-12 PROCEDURE — 83735 ASSAY OF MAGNESIUM: CPT

## 2023-11-12 PROCEDURE — 86703 HIV-1/HIV-2 1 RESULT ANTBDY: CPT

## 2023-11-12 PROCEDURE — 85610 PROTHROMBIN TIME: CPT

## 2023-11-12 PROCEDURE — 87491 CHLMYD TRACH DNA AMP PROBE: CPT

## 2023-11-12 PROCEDURE — 85730 THROMBOPLASTIN TIME PARTIAL: CPT

## 2023-11-12 PROCEDURE — 86850 RBC ANTIBODY SCREEN: CPT

## 2023-11-12 PROCEDURE — 99232 SBSQ HOSP IP/OBS MODERATE 35: CPT | Mod: GC

## 2023-11-12 PROCEDURE — 81001 URINALYSIS AUTO W/SCOPE: CPT

## 2023-11-12 PROCEDURE — 80053 COMPREHEN METABOLIC PANEL: CPT

## 2023-11-12 PROCEDURE — 87653 STREP B DNA AMP PROBE: CPT

## 2023-11-12 PROCEDURE — 85027 COMPLETE CBC AUTOMATED: CPT

## 2023-11-12 PROCEDURE — 87800 DETECT AGNT MULT DNA DIREC: CPT

## 2023-11-12 PROCEDURE — 86901 BLOOD TYPING SEROLOGIC RH(D): CPT

## 2023-11-12 PROCEDURE — 87086 URINE CULTURE/COLONY COUNT: CPT

## 2023-11-12 PROCEDURE — 83036 HEMOGLOBIN GLYCOSYLATED A1C: CPT

## 2023-11-12 PROCEDURE — 84156 ASSAY OF PROTEIN URINE: CPT

## 2023-11-12 PROCEDURE — 84550 ASSAY OF BLOOD/URIC ACID: CPT

## 2023-11-12 PROCEDURE — 87389 HIV-1 AG W/HIV-1&-2 AB AG IA: CPT

## 2023-11-12 PROCEDURE — 36415 COLL VENOUS BLD VENIPUNCTURE: CPT

## 2023-11-12 PROCEDURE — 87591 N.GONORRHOEAE DNA AMP PROB: CPT

## 2023-11-12 PROCEDURE — 86765 RUBEOLA ANTIBODY: CPT

## 2023-11-12 PROCEDURE — 85384 FIBRINOGEN ACTIVITY: CPT

## 2023-11-12 PROCEDURE — 86780 TREPONEMA PALLIDUM: CPT

## 2023-11-12 PROCEDURE — 86762 RUBELLA ANTIBODY: CPT

## 2023-11-12 PROCEDURE — 83615 LACTATE (LD) (LDH) ENZYME: CPT

## 2023-11-12 PROCEDURE — 87340 HEPATITIS B SURFACE AG IA: CPT

## 2023-11-12 PROCEDURE — 82570 ASSAY OF URINE CREATININE: CPT

## 2023-11-12 RX ORDER — MAGNESIUM HYDROXIDE 400 MG/1
30 TABLET, CHEWABLE ORAL DAILY
Refills: 0 | Status: DISCONTINUED | OUTPATIENT
Start: 2023-11-12 | End: 2023-11-12

## 2023-11-12 RX ORDER — LABETALOL HCL 100 MG
1 TABLET ORAL
Qty: 0 | Refills: 0 | DISCHARGE

## 2023-11-12 RX ORDER — LABETALOL HCL 100 MG
1 TABLET ORAL
Qty: 90 | Refills: 0
Start: 2023-11-12 | End: 2023-12-11

## 2023-11-12 RX ORDER — ACETAMINOPHEN 500 MG
1000 TABLET ORAL ONCE
Refills: 0 | Status: COMPLETED | OUTPATIENT
Start: 2023-11-12 | End: 2023-11-12

## 2023-11-12 RX ADMIN — Medication 200 MILLIGRAM(S): at 06:42

## 2023-11-12 RX ADMIN — MAGNESIUM HYDROXIDE 30 MILLILITER(S): 400 TABLET, CHEWABLE ORAL at 09:07

## 2023-11-12 RX ADMIN — Medication 200 MILLIGRAM(S): at 15:38

## 2023-11-12 RX ADMIN — Medication 25 MILLIGRAM(S): at 00:04

## 2023-11-12 RX ADMIN — Medication 400 MILLIGRAM(S): at 11:39

## 2023-11-12 RX ADMIN — Medication 25 MILLIGRAM(S): at 05:59

## 2023-11-12 NOTE — PROGRESS NOTE ADULT - NSPROGADDITIONALINFOA_GEN_ALL_CORE
MFM Fellow Addendum    Patient seen and evaluated this am. Denies headaches, vision changes nausea or vomitting. She is doing well today. She was admitted for hypertension evaluation dt severe BP. Her current regimen is labetalol 200 tid with adequate control. Labs are normal. At this time suspect this is a CHTN exacerbation.     FHT baseline 165 mod floridalma pos accels  cont monitoring at this time due to high baseline. No signs of ingection.    Kehinde Castro DO  MFM Fellow JOSEM Fellow Addendum    Patient seen and evaluated this am. Denies headaches, vision changes nausea or vomitting. She is doing well today. She was admitted for hypertension evaluation dt severe BP. Her current regimen is labetalol 200 tid with adequate control. Labs are normal. At this time suspect this is a CHTN exacerbation.     FHT baseline 165 mod floridal[place] pos accels  tracing reactive for gestational age.     No signs of infection    Kehinde CASPER Fellow      Addendum patient seen with Dr Stringer. Mild headache per patient being treated with tylenol at this time. If headache resolves and blood pressures remain well control could consider discharge later today.      Kehinde SLADEM Fellow

## 2023-11-12 NOTE — PROGRESS NOTE ADULT - SUBJECTIVE AND OBJECTIVE BOX
33y  at 25wks GA by LMP 23 who is admitted for cHTN exacerbation vs cHTN with superimposed PEC.    Prenatal course complicated by:  1. Limited PNC - 2 OB visits this pregnancy  2. cHTN, on labetalol 200mg BID  3. Hx of uterine rupture  4. Hx of CSx2    SUBJECTIVE:  Patient feeling well. Denies current HA.  Denies CTX, LOF, VB. Endorses good fetal movement.  Denies visual changes, SOB and epigastric pain.    Vital Signs Last 24 Hrs  T(C): 37 (2023 06:35), Max: 37 (2023 06:35)  T(F): 98.6 (2023 06:35), Max: 98.6 (2023 06:35)  HR: 96 (2023 06:35) (87 - 101)  BP: 137/70 (2023 06:35) (113/58 - 138/73)  BP(mean): --  RR: 18 (2023 06:35) (17 - 18)  SpO2: 96% (2023 06:35) (96% - 99%)    Parameters below as of 2023 06:35  Patient On (Oxygen Delivery Method): room air        Gen: AAOx3  Abd: soft, gravid  Ext: no tenderness to calf palpation    FHT: baseline 140, moderate variability, + acels, -decels  [place]: CTX rare     Labs:                        9.1    8.18  )-----------( 250      ( 10 Nov 2023 06:00 )             27.7   11-10    137  |  102  |  4.3<L>  ----------------------------<  152<H>  4.3   |  22.0  |  0.51    Ca    7.8<L>      10 Nov 2023 06:00  Mg     4.9     11-10    TPro  6.4<L>  /  Alb  3.7  /  TBili  <0.2<L>  /  DBili  x   /  AST  12  /  ALT  10  /  AlkPhos  76  11-10

## 2023-11-12 NOTE — PROGRESS NOTE ADULT - PROBLEM SELECTOR PLAN 2
- Hx of cHTN regimen increased to labetalol 300mg TID then decreased to 200 TID  - Normotensive 100-110/50-60s  - S/p MgSO4 for maternal seizure ppx   - S/p Betamethasone 12mg q24hrs for 2 doses for fetal lung maturity  - PIH labs wnl   - goal possible discharge today pending AM rounds - Hx of cHTN regimen increased to labetalol 300mg TID then decreased to 200 TID  - Normotensive   - S/p MgSO4 for maternal seizure ppx   - S/p Betamethasone 12mg q24hrs for 2 doses for fetal lung maturity  - PIH labs wnl   - goal possible discharge today pending AM rounds

## 2023-11-12 NOTE — PROGRESS NOTE ADULT - ASSESSMENT
33y  at 25wks GA by LMP 23 who is admitted for cHTN exacerbation vs cHTN with superimposed PEC.
33y  at 25wks GA by LMP 23 who is admitted for cHTN exacerbation vs cHTN with superimposed PEC.

## 2023-11-12 NOTE — PROGRESS NOTE ADULT - ATTENDING COMMENTS
MFM - IUP at 25 weeks with pregnancy complicated by chronic hypertension admitted with elevated BP. Patient received MGSO4 for 24 hours and betamethasone complete while observed for exacerbation of chronic hypertension versus superimposed preeclampsia.  Labetalol titrated from 200 mg BID to 300 mg TID. Laboratory evaluation normal.  This am, patient feels well and reports no symptoms of preeclampsia. No further contractions. BP has been normotensive, with multiple values <120/80. Fetus AGA on ultrasound with mild polyhydramnios. FHRT reassuring.  Patient advised of the results of the evaluation to date.  Clinical presentation more supportive of exacerbation of chronic hypertension.  May need to decrease labetalol to 200 mg TID given values <120/80.  Patient advised to ambulate, shower, and resume normal activity.  Serial BP and laboratory evaluation.  Complete 48 hours of indocin.  Continue inpatient management.   Cleo Stringer MD  Maternal Fetal Medicine
MFM - IUP at 25w2d admitted for management of exacerbation of chronic hypertension. No overnight events.  Reports mild headache improved with Tylenol. FHRT with baseline 165, +accels, -decels, reassuring for gestational age.  BP stable on labetalol 200 TID. Laboratory evaluation significant for anemia and vaginal candidiasis. Patient reports no symptoms.  Will need outpatient follow up.  Routine discharge instructions reviewed.   Cleo Stringer MD  Maternal Fetal Medicine

## 2023-11-12 NOTE — PROGRESS NOTE ADULT - PROBLEM SELECTOR PLAN 1
- Limited PNC, only seen x2 in this pregnancy  - Rubella/Rubeola immune, HepB NR  - A1c 5.0  - FHT reassuring, on continuous monitoring  - Daily PNV - Limited PNC, only seen x2 in this pregnancy  - Rubella/Rubeola immune, HepB NR  - A1c 5.0  - FHT reassuring,  - Daily PNV

## 2023-11-21 ENCOUNTER — INPATIENT (INPATIENT)
Facility: HOSPITAL | Age: 33
LOS: 0 days | Discharge: HOME | DRG: 833 | End: 2023-11-22
Attending: OBSTETRICS & GYNECOLOGY | Admitting: OBSTETRICS & GYNECOLOGY
Payer: COMMERCIAL

## 2023-11-21 VITALS — SYSTOLIC BLOOD PRESSURE: 141 MMHG | HEART RATE: 109 BPM | DIASTOLIC BLOOD PRESSURE: 76 MMHG

## 2023-11-21 DIAGNOSIS — Z3A.26 26 WEEKS GESTATION OF PREGNANCY: ICD-10-CM

## 2023-11-21 DIAGNOSIS — S37.69XD: ICD-10-CM

## 2023-11-21 DIAGNOSIS — Z98.89 OTHER SPECIFIED POSTPROCEDURAL STATES: Chronic | ICD-10-CM

## 2023-11-21 DIAGNOSIS — L05.91 PILONIDAL CYST WITHOUT ABSCESS: Chronic | ICD-10-CM

## 2023-11-21 DIAGNOSIS — O10.919 UNSPECIFIED PRE-EXISTING HYPERTENSION COMPLICATING PREGNANCY, UNSPECIFIED TRIMESTER: ICD-10-CM

## 2023-11-21 DIAGNOSIS — O26.899 OTHER SPECIFIED PREGNANCY RELATED CONDITIONS, UNSPECIFIED TRIMESTER: ICD-10-CM

## 2023-11-21 DIAGNOSIS — O34.219 MATERNAL CARE FOR UNSPECIFIED TYPE SCAR FROM PREVIOUS CESAREAN DELIVERY: ICD-10-CM

## 2023-11-21 LAB
ALBUMIN SERPL ELPH-MCNC: 3.6 G/DL — SIGNIFICANT CHANGE UP (ref 3.3–5.2)
ALBUMIN SERPL ELPH-MCNC: 3.6 G/DL — SIGNIFICANT CHANGE UP (ref 3.3–5.2)
ALP SERPL-CCNC: 82 U/L — SIGNIFICANT CHANGE UP (ref 40–120)
ALP SERPL-CCNC: 82 U/L — SIGNIFICANT CHANGE UP (ref 40–120)
ALT FLD-CCNC: 10 U/L — SIGNIFICANT CHANGE UP
ALT FLD-CCNC: 10 U/L — SIGNIFICANT CHANGE UP
ANION GAP SERPL CALC-SCNC: 11 MMOL/L — SIGNIFICANT CHANGE UP (ref 5–17)
ANION GAP SERPL CALC-SCNC: 11 MMOL/L — SIGNIFICANT CHANGE UP (ref 5–17)
APPEARANCE UR: CLEAR — SIGNIFICANT CHANGE UP
APPEARANCE UR: CLEAR — SIGNIFICANT CHANGE UP
APTT BLD: 26.4 SEC — SIGNIFICANT CHANGE UP (ref 24.5–35.6)
APTT BLD: 26.4 SEC — SIGNIFICANT CHANGE UP (ref 24.5–35.6)
AST SERPL-CCNC: 13 U/L — SIGNIFICANT CHANGE UP
AST SERPL-CCNC: 13 U/L — SIGNIFICANT CHANGE UP
BASOPHILS # BLD AUTO: 0.04 K/UL — SIGNIFICANT CHANGE UP (ref 0–0.2)
BASOPHILS # BLD AUTO: 0.04 K/UL — SIGNIFICANT CHANGE UP (ref 0–0.2)
BASOPHILS NFR BLD AUTO: 0.4 % — SIGNIFICANT CHANGE UP (ref 0–2)
BASOPHILS NFR BLD AUTO: 0.4 % — SIGNIFICANT CHANGE UP (ref 0–2)
BILIRUB SERPL-MCNC: <0.2 MG/DL — LOW (ref 0.4–2)
BILIRUB SERPL-MCNC: <0.2 MG/DL — LOW (ref 0.4–2)
BILIRUB UR-MCNC: NEGATIVE — SIGNIFICANT CHANGE UP
BILIRUB UR-MCNC: NEGATIVE — SIGNIFICANT CHANGE UP
BUN SERPL-MCNC: 10.9 MG/DL — SIGNIFICANT CHANGE UP (ref 8–20)
BUN SERPL-MCNC: 10.9 MG/DL — SIGNIFICANT CHANGE UP (ref 8–20)
CALCIUM SERPL-MCNC: 8.8 MG/DL — SIGNIFICANT CHANGE UP (ref 8.4–10.5)
CALCIUM SERPL-MCNC: 8.8 MG/DL — SIGNIFICANT CHANGE UP (ref 8.4–10.5)
CHLORIDE SERPL-SCNC: 104 MMOL/L — SIGNIFICANT CHANGE UP (ref 96–108)
CHLORIDE SERPL-SCNC: 104 MMOL/L — SIGNIFICANT CHANGE UP (ref 96–108)
CO2 SERPL-SCNC: 23 MMOL/L — SIGNIFICANT CHANGE UP (ref 22–29)
CO2 SERPL-SCNC: 23 MMOL/L — SIGNIFICANT CHANGE UP (ref 22–29)
COLOR SPEC: YELLOW — SIGNIFICANT CHANGE UP
COLOR SPEC: YELLOW — SIGNIFICANT CHANGE UP
CREAT ?TM UR-MCNC: 249 MG/DL — SIGNIFICANT CHANGE UP
CREAT ?TM UR-MCNC: 249 MG/DL — SIGNIFICANT CHANGE UP
CREAT SERPL-MCNC: 0.59 MG/DL — SIGNIFICANT CHANGE UP (ref 0.5–1.3)
CREAT SERPL-MCNC: 0.59 MG/DL — SIGNIFICANT CHANGE UP (ref 0.5–1.3)
DIFF PNL FLD: NEGATIVE — SIGNIFICANT CHANGE UP
DIFF PNL FLD: NEGATIVE — SIGNIFICANT CHANGE UP
EGFR: 123 ML/MIN/1.73M2 — SIGNIFICANT CHANGE UP
EGFR: 123 ML/MIN/1.73M2 — SIGNIFICANT CHANGE UP
EOSINOPHIL # BLD AUTO: 0.15 K/UL — SIGNIFICANT CHANGE UP (ref 0–0.5)
EOSINOPHIL # BLD AUTO: 0.15 K/UL — SIGNIFICANT CHANGE UP (ref 0–0.5)
EOSINOPHIL NFR BLD AUTO: 1.6 % — SIGNIFICANT CHANGE UP (ref 0–6)
EOSINOPHIL NFR BLD AUTO: 1.6 % — SIGNIFICANT CHANGE UP (ref 0–6)
FIBRINOGEN PPP-MCNC: 640 MG/DL — HIGH (ref 200–450)
FIBRINOGEN PPP-MCNC: 640 MG/DL — HIGH (ref 200–450)
GLUCOSE SERPL-MCNC: 82 MG/DL — SIGNIFICANT CHANGE UP (ref 70–99)
GLUCOSE SERPL-MCNC: 82 MG/DL — SIGNIFICANT CHANGE UP (ref 70–99)
GLUCOSE UR QL: NEGATIVE MG/DL — SIGNIFICANT CHANGE UP
GLUCOSE UR QL: NEGATIVE MG/DL — SIGNIFICANT CHANGE UP
HCT VFR BLD CALC: 28.2 % — LOW (ref 34.5–45)
HCT VFR BLD CALC: 28.2 % — LOW (ref 34.5–45)
HGB BLD-MCNC: 9.2 G/DL — LOW (ref 11.5–15.5)
HGB BLD-MCNC: 9.2 G/DL — LOW (ref 11.5–15.5)
IMM GRANULOCYTES NFR BLD AUTO: 0.6 % — SIGNIFICANT CHANGE UP (ref 0–0.9)
IMM GRANULOCYTES NFR BLD AUTO: 0.6 % — SIGNIFICANT CHANGE UP (ref 0–0.9)
INR BLD: 0.97 RATIO — SIGNIFICANT CHANGE UP (ref 0.85–1.18)
INR BLD: 0.97 RATIO — SIGNIFICANT CHANGE UP (ref 0.85–1.18)
KETONES UR-MCNC: ABNORMAL MG/DL
KETONES UR-MCNC: ABNORMAL MG/DL
LDH SERPL L TO P-CCNC: 192 U/L — SIGNIFICANT CHANGE UP (ref 98–192)
LDH SERPL L TO P-CCNC: 192 U/L — SIGNIFICANT CHANGE UP (ref 98–192)
LEUKOCYTE ESTERASE UR-ACNC: NEGATIVE — SIGNIFICANT CHANGE UP
LEUKOCYTE ESTERASE UR-ACNC: NEGATIVE — SIGNIFICANT CHANGE UP
LYMPHOCYTES # BLD AUTO: 1.54 K/UL — SIGNIFICANT CHANGE UP (ref 1–3.3)
LYMPHOCYTES # BLD AUTO: 1.54 K/UL — SIGNIFICANT CHANGE UP (ref 1–3.3)
LYMPHOCYTES # BLD AUTO: 16.6 % — SIGNIFICANT CHANGE UP (ref 13–44)
LYMPHOCYTES # BLD AUTO: 16.6 % — SIGNIFICANT CHANGE UP (ref 13–44)
MCHC RBC-ENTMCNC: 26.6 PG — LOW (ref 27–34)
MCHC RBC-ENTMCNC: 26.6 PG — LOW (ref 27–34)
MCHC RBC-ENTMCNC: 32.6 GM/DL — SIGNIFICANT CHANGE UP (ref 32–36)
MCHC RBC-ENTMCNC: 32.6 GM/DL — SIGNIFICANT CHANGE UP (ref 32–36)
MCV RBC AUTO: 81.5 FL — SIGNIFICANT CHANGE UP (ref 80–100)
MCV RBC AUTO: 81.5 FL — SIGNIFICANT CHANGE UP (ref 80–100)
MONOCYTES # BLD AUTO: 0.77 K/UL — SIGNIFICANT CHANGE UP (ref 0–0.9)
MONOCYTES # BLD AUTO: 0.77 K/UL — SIGNIFICANT CHANGE UP (ref 0–0.9)
MONOCYTES NFR BLD AUTO: 8.3 % — SIGNIFICANT CHANGE UP (ref 2–14)
MONOCYTES NFR BLD AUTO: 8.3 % — SIGNIFICANT CHANGE UP (ref 2–14)
NEUTROPHILS # BLD AUTO: 6.71 K/UL — SIGNIFICANT CHANGE UP (ref 1.8–7.4)
NEUTROPHILS # BLD AUTO: 6.71 K/UL — SIGNIFICANT CHANGE UP (ref 1.8–7.4)
NEUTROPHILS NFR BLD AUTO: 72.5 % — SIGNIFICANT CHANGE UP (ref 43–77)
NEUTROPHILS NFR BLD AUTO: 72.5 % — SIGNIFICANT CHANGE UP (ref 43–77)
NITRITE UR-MCNC: NEGATIVE — SIGNIFICANT CHANGE UP
NITRITE UR-MCNC: NEGATIVE — SIGNIFICANT CHANGE UP
PH UR: 5.5 — SIGNIFICANT CHANGE UP (ref 5–8)
PH UR: 5.5 — SIGNIFICANT CHANGE UP (ref 5–8)
PLATELET # BLD AUTO: 275 K/UL — SIGNIFICANT CHANGE UP (ref 150–400)
PLATELET # BLD AUTO: 275 K/UL — SIGNIFICANT CHANGE UP (ref 150–400)
POTASSIUM SERPL-MCNC: 4.4 MMOL/L — SIGNIFICANT CHANGE UP (ref 3.5–5.3)
POTASSIUM SERPL-MCNC: 4.4 MMOL/L — SIGNIFICANT CHANGE UP (ref 3.5–5.3)
POTASSIUM SERPL-SCNC: 4.4 MMOL/L — SIGNIFICANT CHANGE UP (ref 3.5–5.3)
POTASSIUM SERPL-SCNC: 4.4 MMOL/L — SIGNIFICANT CHANGE UP (ref 3.5–5.3)
PROT SERPL-MCNC: 6.4 G/DL — LOW (ref 6.6–8.7)
PROT SERPL-MCNC: 6.4 G/DL — LOW (ref 6.6–8.7)
PROT UR-MCNC: SIGNIFICANT CHANGE UP MG/DL
PROT UR-MCNC: SIGNIFICANT CHANGE UP MG/DL
PROTHROM AB SERPL-ACNC: 10.8 SEC — SIGNIFICANT CHANGE UP (ref 9.5–13)
PROTHROM AB SERPL-ACNC: 10.8 SEC — SIGNIFICANT CHANGE UP (ref 9.5–13)
RBC # BLD: 3.46 M/UL — LOW (ref 3.8–5.2)
RBC # BLD: 3.46 M/UL — LOW (ref 3.8–5.2)
RBC # FLD: 12.3 % — SIGNIFICANT CHANGE UP (ref 10.3–14.5)
RBC # FLD: 12.3 % — SIGNIFICANT CHANGE UP (ref 10.3–14.5)
SODIUM SERPL-SCNC: 138 MMOL/L — SIGNIFICANT CHANGE UP (ref 135–145)
SODIUM SERPL-SCNC: 138 MMOL/L — SIGNIFICANT CHANGE UP (ref 135–145)
SP GR SPEC: >1.03 — HIGH (ref 1–1.03)
SP GR SPEC: >1.03 — HIGH (ref 1–1.03)
URATE SERPL-MCNC: 4.7 MG/DL — SIGNIFICANT CHANGE UP (ref 2.4–5.7)
URATE SERPL-MCNC: 4.7 MG/DL — SIGNIFICANT CHANGE UP (ref 2.4–5.7)
UROBILINOGEN FLD QL: 1 MG/DL — SIGNIFICANT CHANGE UP (ref 0.2–1)
UROBILINOGEN FLD QL: 1 MG/DL — SIGNIFICANT CHANGE UP (ref 0.2–1)
WBC # BLD: 9.27 K/UL — SIGNIFICANT CHANGE UP (ref 3.8–10.5)
WBC # BLD: 9.27 K/UL — SIGNIFICANT CHANGE UP (ref 3.8–10.5)
WBC # FLD AUTO: 9.27 K/UL — SIGNIFICANT CHANGE UP (ref 3.8–10.5)
WBC # FLD AUTO: 9.27 K/UL — SIGNIFICANT CHANGE UP (ref 3.8–10.5)

## 2023-11-21 RX ORDER — LABETALOL HCL 100 MG
200 TABLET ORAL EVERY 8 HOURS
Refills: 0 | Status: DISCONTINUED | OUTPATIENT
Start: 2023-11-21 | End: 2023-11-22

## 2023-11-21 RX ADMIN — Medication 200 MILLIGRAM(S): at 23:59

## 2023-11-21 NOTE — OB PROVIDER TRIAGE NOTE - NSOBPROVIDERNOTE_OBGYN_ALL_OB_FT
A/P:  33y  at 26w3d GA who presents to L&D for rule out PEC    BP 140s/70s  PIH labs WNL  Fetus: Reactive and reassuring  Painter: Irritability  Consult MFM for BP managment  Ensure patient has proper outpatient follow up    Dispo: Continue to observe.     Discussed with Dr. Douglass

## 2023-11-21 NOTE — CONSULT NOTE ADULT - PROBLEM SELECTOR RECOMMENDATION 2
- Hx of cHTN regimen labetalol 200mg TID (7AM/3PM/11PM)  - Previously admitted for cHTN exacerbation vs cHTN with superimposed PEC on 11/9-11/12. Received betamethasone (11/10-11/11) and MgSO4 during prior admission.   - PIH labs wnl - Hx of cHTN regimen labetalol 200mg TID (7AM/3PM/11PM)  - Previously admitted for cHTN exacerbation vs cHTN with superimposed PEC on 11/9-11/12. Received betamethasone (11/9-11/10) and MgSO4 during prior admission.   - PIH labs wnl

## 2023-11-21 NOTE — OB PROVIDER H&P - ATTENDING COMMENTS
33y  at 26w3d GA who presents to L&D for an episode of vision changes without headache. Patient reported home BPs 150-160/110 on current regimen of labetalol 200mg TID. Patient has not been able to establish outpatient care at this time. Pt denies headaches, RUQ pain, epigastric pain and new-onset edema. Pt denies vaginal bleeding, contractions and leakage of fluid. She endorses good fetal movement. She denies fevers, chills, nausea, vomiting. She denies shortness of breath, chest pain, and palpitations.    LMP: 2023  MAGALY: 2024    Prenatal course complicated by:  1. Limited PNC - 2 OB visits this pregnancy - at last OB visit told she had a lot of protein in the urine  2. cHTN, on labetalol 200mg TID  3. Hx of uterine rupture  4. Hx of CSx2    POB:   G1: pCS (; failed IOL, complicated by uterine rupture)  G2: rCS () complicated by gHTN  PGYN: -fibroids, -ovarian cysts, denies STD hx, denies abnormal PAPs   PMH: cHTN, anxiety  PSH: CS x2, gastric sleeve (), cholecystectomy (), excision of pilonidal cyst      BP: 144/76 (2023 23:37) (141/76 - 144/76)  RR: 17 (2023 21:52) (17 - 17)  SpO2: 98% (2023 23:36) (92% - 100%)    Parameters below as of 2023 21:52  Patient On (Oxygen Delivery Method): room air    Gen: AAOx3  Abd: soft, gravid  Ext: no tenderness to calf palpation    FHT: baseline 140, moderate variability, + accels, -decels   Reactive FHT  [place]: no CTX  SH: Denies EtOH, tobacco and illicit drug use during this pregnancy  Meds: PNVs, labetalol 200mg TID      A/P  33y  at 26w3d GA who presents for BP monitoring in the setting of cHTN exacerbation vs cHTN with superimposed PEC.    -Admit  -Consent  -BPs 140s/70, continue to cycle  -Continue with labetalol 200 TID  -PIH labs wnl, pending P:C  -MFM consult

## 2023-11-21 NOTE — OB PROVIDER TRIAGE NOTE - HISTORY OF PRESENT ILLNESS
KRIS PA is a 33y  at 26w3d GA who presents to L&D for an episode of vision changes without headache as well as elevated blood pressures at home. Patient has been taking  BP at home and found to have 160/110 before taking her scheduled labetalol. Patient then retook her BP and it was lowered, but 2 hour later was 150s/90s which prompted her to come into hospital. patient has not been able to establish outpatient care at this time. Patient is currently taking labetalol 200 TID.    Pt denies vaginal bleeding, contractions and leakage of fluid. She endorses good fetal movement.  Pt denies trauma.   Pt denies headaches, RUQ pain, epigastric pain and new-onset edema.   She denies any urinary complaints.   She denies fevers, chills, nausea, vomiting.   She denies shortness of breath, chest pain, and palpitations.    LMP: 2023  MAGALY: 2024    Prenatal course complicated by:  1. Limited PNC - 2 OB visits this pregnancy - at last OB visit told she had a lot of protein in the urine  2. cHTN, on labetalol 200mg BID  3. Hx of uterine rupture  4. Hx of CSx2    POB:   G1: pCS (; failed IOL, complicated by uterine rupture)  G2: rCS () complicated by gHTN  PGYN: -fibroids, -ovarian cysts, denies STD hx, denies abnormal PAPs   PMH: cHTN, anxiety  PSH: CS x2, gastric sleeve (), cholecystectomy (), excision of pilonidal cyst  SH: Denies EtOH, tobacco and illicit drug use during this pregnancy  Meds: PNVs, labetalol 200mg BID  Allergies: NKDA

## 2023-11-21 NOTE — OB PROVIDER H&P - HISTORY OF PRESENT ILLNESS
KRIS PA is a 33y  at 26w3d GA who presents to L&D for an episode of vision changes without headache. Patient reported home BPs 150-160/110 on current regimen of labetalol 200mg TID. Patient has not been able to establish outpatient care at this time. Pt denies headaches, RUQ pain, epigastric pain and new-onset edema. Pt denies vaginal bleeding, contractions and leakage of fluid. She endorses good fetal movement. She denies fevers, chills, nausea, vomiting. She denies shortness of breath, chest pain, and palpitations.    LMP: 2023  MAGALY: 2024    Prenatal course complicated by:  1. Limited PNC - 2 OB visits this pregnancy - at last OB visit told she had a lot of protein in the urine  2. cHTN, on labetalol 200mg TID  3. Hx of uterine rupture  4. Hx of CSx2    POB:   G1: pCS (; failed IOL, complicated by uterine rupture)  G2: rCS () complicated by gHTN  PGYN: -fibroids, -ovarian cysts, denies STD hx, denies abnormal PAPs   PMH: cHTN, anxiety  PSH: CS x2, gastric sleeve (), cholecystectomy (), excision of pilonidal cyst  SH: Denies EtOH, tobacco and illicit drug use during this pregnancy  Meds: PNVs, labetalol 200mg TID  Allergies: NKDA

## 2023-11-21 NOTE — OB PROVIDER H&P - ASSESSMENT
A/P: KRIS PA is a 33y  at 26w3d GA who presents for BP monitoring in the setting of cHTN exacerbation vs cHTN with superimposed PEC.    -Admit  -Consent  -BPs 140s/70, continue to cycle  -Continue with labetalol 200 TID  -PIH labs wnl, pending P:C  -MFM consult    Discussed with Dr. Douglass

## 2023-11-21 NOTE — CONSULT NOTE ADULT - PROBLEM SELECTOR RECOMMENDATION 9
- Limited PNC, only seen x2 in this pregnancy  - Rubella/Rubeola immune, HepB NR  - A1c 5.0  - FHT reassuring  - Daily PNV - Limited PNC, only seen x2 in this pregnancy  - Rubella/Rubeola immune, HepB NR  - A1c 5.0  - US (11/10): vtx, post placenta, EFW 70%ile, MVP 8.8  - FHT reassuring  - Daily PNV

## 2023-11-21 NOTE — CONSULT NOTE ADULT - SUBJECTIVE AND OBJECTIVE BOX
KRIS PA is a 33y  at 26w3d GA who presents to L&D for an episode of vision changes without headache. Patient reported home BPs 150-160/110 on current regimen of labetalol 200mg TID. Patient has not been able to establish outpatient care at this time. Pt denies headaches, RUQ pain, epigastric pain and new-onset edema. Pt denies vaginal bleeding, contractions and leakage of fluid. She endorses good fetal movement. She denies fevers, chills, nausea, vomiting. She denies shortness of breath, chest pain, and palpitations.    LMP: 2023  MAGALY: 2024    Prenatal course complicated by:  1. Limited PNC - 2 OB visits this pregnancy - at last OB visit told she had a lot of protein in the urine  2. cHTN, on labetalol 200mg TID  3. Hx of uterine rupture  4. Hx of CSx2    POB:   G1: pCS (; failed IOL, complicated by uterine rupture)  G2: rCS () complicated by gHTN  PGYN: -fibroids, -ovarian cysts, denies STD hx, denies abnormal PAPs   PMH: cHTN, anxiety  PSH: CS x2, gastric sleeve (), cholecystectomy (), excision of pilonidal cyst  SH: Denies EtOH, tobacco and illicit drug use during this pregnancy  Meds: PNVs, labetalol 200mg TID  Allergies: NKDA    Vital Signs Last 24 Hrs  T(C): 36.6 (2023 21:52), Max: 36.6 (2023 21:52)  T(F): 97.9 (2023 21:52), Max: 97.9 (2023 21:52)  HR: 93 (2023 23:37) (93 - 109)  BP: 144/76 (2023 23:37) (141/76 - 144/76)  RR: 17 (2023 21:52) (17 - 17)  SpO2: 98% (2023 23:36) (92% - 100%)    Parameters below as of 2023 21:52  Patient On (Oxygen Delivery Method): room air    Gen: AAOx3  Abd: soft, gravid  Ext: no tenderness to calf palpation    FHT: baseline 140, moderate variability, + acels, -decels  [place]: no CTX                          9.2    9.27  )-----------( 275      ( 2023 22:00 )             28.2       138  |  104  |  10.9  ----------------------------<  82  4.4   |  23.0  |  0.59    Ca    8.8      2023 22:00    TPro  6.4<L>  /  Alb  3.6  /  TBili  <0.2<L>  /  DBili  x   /  AST  13  /  ALT  10  /  AlkPhos  82  -   KRIS PA is a 33y  at 26w3d GA who presents to L&D for an episode of vision changes without headache. Patient reported home BPs 150-160/110 on current regimen of labetalol 200mg TID. Patient has not been able to establish outpatient care at this time. Pt denies headaches, RUQ pain, epigastric pain and new-onset edema. Pt denies vaginal bleeding, contractions and leakage of fluid. She endorses good fetal movement. She denies fevers, chills, nausea, vomiting. She denies shortness of breath, chest pain, and palpitations.    LMP: 2023  MAGALY: 2024    Prenatal course complicated by:  1. Limited PNC - 2 OB visits this pregnancy - at last OB visit told she had a lot of protein in the urine  2. cHTN, on labetalol 200mg TID  3. Hx of uterine rupture  4. Hx of CSx2  5. Polyhydramnios    POB:   G1: pCS (; failed IOL, complicated by uterine rupture)  G2: rCS () complicated by gHTN  PGYN: -fibroids, -ovarian cysts, denies STD hx, denies abnormal PAPs   PMH: cHTN, anxiety  PSH: CS x2, gastric sleeve (), cholecystectomy (), excision of pilonidal cyst  SH: Denies EtOH, tobacco and illicit drug use during this pregnancy  Meds: PNVs, labetalol 200mg TID  Allergies: NKDA    Vital Signs Last 24 Hrs  T(C): 36.6 (2023 21:52), Max: 36.6 (2023 21:52)  T(F): 97.9 (2023 21:52), Max: 97.9 (2023 21:52)  HR: 93 (2023 23:37) (93 - 109)  BP: 144/76 (2023 23:37) (141/76 - 144/76)  RR: 17 (2023 21:52) (17 - 17)  SpO2: 98% (2023 23:36) (92% - 100%)    Parameters below as of 2023 21:52  Patient On (Oxygen Delivery Method): room air    Gen: AAOx3  Abd: soft, gravid  Ext: no tenderness to calf palpation    FHT: baseline 140, moderate variability, + acels, -decels  [place]: no CTX                          9.2    9.27  )-----------( 275      ( 2023 22:00 )             28.2       138  |  104  |  10.9  ----------------------------<  82  4.4   |  23.0  |  0.59    Ca    8.8      2023 22:00    TPro  6.4<L>  /  Alb  3.6  /  TBili  <0.2<L>  /  DBili  x   /  AST  13  /  ALT  10  /  AlkPhos  82

## 2023-11-21 NOTE — OB PROVIDER TRIAGE NOTE - NSHPPHYSICALEXAM_GEN_ALL_CORE
T(C): 36.6 (11-21-23 @ 21:52), Max: 36.6 (11-21-23 @ 21:52)  HR: 104 (11-21-23 @ 23:11) (95 - 109)  BP: 141/76 (11-21-23 @ 21:52) (141/76 - 141/76)  RR: 17 (11-21-23 @ 21:52) (17 - 17)  SpO2: 100% (11-21-23 @ 23:11) (92% - 100%)  Gen: NAD, well-appearing  Heart: S1 S2, RRR  Lungs: CTAB  Abd: soft, gravid  Ext: non-edematous, non-tender     FHT: 160 baseline, moderate variability, + accels, -decels  [place]: irritability

## 2023-11-21 NOTE — OB PROVIDER H&P - NSHPLABSRESULTS_GEN_ALL_CORE
9.2    9.27  )-----------( 275      ( 21 Nov 2023 22:00 )             28.2   11-21    138  |  104  |  10.9  ----------------------------<  82  4.4   |  23.0  |  0.59    Ca    8.8      21 Nov 2023 22:00    TPro  6.4<L>  /  Alb  3.6  /  TBili  <0.2<L>  /  DBili  x   /  AST  13  /  ALT  10  /  AlkPhos  82  11-21    Urinalysis Basic - ( 21 Nov 2023 22:00 )    Color: Yellow / Appearance: Clear / SG: >1.030 / pH: x  Gluc: 82 mg/dL / Ketone: Trace mg/dL  / Bili: Negative / Urobili: 1.0 mg/dL   Blood: x / Protein: Trace mg/dL / Nitrite: Negative   Leuk Esterase: Negative / RBC: x / WBC x   Sq Epi: x / Non Sq Epi: x / Bacteria: x

## 2023-11-21 NOTE — CONSULT NOTE ADULT - NSCONSULTADDITIONALINFOA_GEN_ALL_CORE
PROVIDER:[TOKEN:[[id number]]]
JONNATHAN Fellow Addendum    I have not evaluated this patient in person, my recommendations based on the evaluation by the in house resident and my discussion with Dr Coffey the attending. In brief the patient is a  history of 2 prior CS with chtn. She was previously admitted and received steroids at that time. She presented for severe hypertension at home prior to taking her home medication of labetalol 200 tid. In triage her BP were in the mild range. I recommended we give her the home labetalol 200 tid at this time. Additionally the patient's labs are normal and therefore her history of not taking medications and lack of lab abnormalities is consistent with CHTN exacerbation rather than SI pree.     In light of her recent severe range blood pressures observation was recommended.    AKILA Coffey.     Kehinde CASPER Attending

## 2023-11-21 NOTE — CONSULT NOTE ADULT - PROBLEM SELECTOR RECOMMENDATION 4
- Hx of uterine rupture as per patient in 2009  - Plan for rCS - Hx of CS x 2 (2009, 2014)  - Plan for rCS

## 2023-11-21 NOTE — OB PROVIDER H&P - NSHPPHYSICALEXAM_GEN_ALL_CORE
Vital Signs Last 24 Hrs  T(C): 36.6 (21 Nov 2023 21:52), Max: 36.6 (21 Nov 2023 21:52)  T(F): 97.9 (21 Nov 2023 21:52), Max: 97.9 (21 Nov 2023 21:52)  HR: 93 (21 Nov 2023 23:37) (93 - 109)  BP: 144/76 (21 Nov 2023 23:37) (141/76 - 144/76)  RR: 17 (21 Nov 2023 21:52) (17 - 17)  SpO2: 98% (21 Nov 2023 23:36) (92% - 100%)    Parameters below as of 21 Nov 2023 21:52  Patient On (Oxygen Delivery Method): room air    Gen: AAOx3  Abd: soft, gravid  Ext: no tenderness to calf palpation    FHT: baseline 140, moderate variability, + acels, -decels  [place]: no CTX

## 2023-11-22 ENCOUNTER — TRANSCRIPTION ENCOUNTER (OUTPATIENT)
Age: 33
End: 2023-11-22

## 2023-11-22 VITALS
DIASTOLIC BLOOD PRESSURE: 55 MMHG | TEMPERATURE: 98 F | RESPIRATION RATE: 16 BRPM | SYSTOLIC BLOOD PRESSURE: 103 MMHG | HEART RATE: 76 BPM

## 2023-11-22 DIAGNOSIS — O26.893 OTHER SPECIFIED PREGNANCY RELATED CONDITIONS, THIRD TRIMESTER: ICD-10-CM

## 2023-11-22 DIAGNOSIS — O40.9XX0 POLYHYDRAMNIOS, UNSPECIFIED TRIMESTER, NOT APPLICABLE OR UNSPECIFIED: ICD-10-CM

## 2023-11-22 DIAGNOSIS — O99.013 ANEMIA COMPLICATING PREGNANCY, THIRD TRIMESTER: ICD-10-CM

## 2023-11-22 LAB
ALBUMIN SERPL ELPH-MCNC: 3.4 G/DL — SIGNIFICANT CHANGE UP (ref 3.3–5.2)
ALBUMIN SERPL ELPH-MCNC: 3.4 G/DL — SIGNIFICANT CHANGE UP (ref 3.3–5.2)
ALP SERPL-CCNC: 81 U/L — SIGNIFICANT CHANGE UP (ref 40–120)
ALP SERPL-CCNC: 81 U/L — SIGNIFICANT CHANGE UP (ref 40–120)
ALT FLD-CCNC: 10 U/L — SIGNIFICANT CHANGE UP
ALT FLD-CCNC: 10 U/L — SIGNIFICANT CHANGE UP
ANION GAP SERPL CALC-SCNC: 10 MMOL/L — SIGNIFICANT CHANGE UP (ref 5–17)
ANION GAP SERPL CALC-SCNC: 10 MMOL/L — SIGNIFICANT CHANGE UP (ref 5–17)
AST SERPL-CCNC: 13 U/L — SIGNIFICANT CHANGE UP
AST SERPL-CCNC: 13 U/L — SIGNIFICANT CHANGE UP
BILIRUB SERPL-MCNC: 0.2 MG/DL — LOW (ref 0.4–2)
BILIRUB SERPL-MCNC: 0.2 MG/DL — LOW (ref 0.4–2)
BLD GP AB SCN SERPL QL: SIGNIFICANT CHANGE UP
BLD GP AB SCN SERPL QL: SIGNIFICANT CHANGE UP
BUN SERPL-MCNC: 7.9 MG/DL — LOW (ref 8–20)
BUN SERPL-MCNC: 7.9 MG/DL — LOW (ref 8–20)
CALCIUM SERPL-MCNC: 8.6 MG/DL — SIGNIFICANT CHANGE UP (ref 8.4–10.5)
CALCIUM SERPL-MCNC: 8.6 MG/DL — SIGNIFICANT CHANGE UP (ref 8.4–10.5)
CHLORIDE SERPL-SCNC: 104 MMOL/L — SIGNIFICANT CHANGE UP (ref 96–108)
CHLORIDE SERPL-SCNC: 104 MMOL/L — SIGNIFICANT CHANGE UP (ref 96–108)
CO2 SERPL-SCNC: 25 MMOL/L — SIGNIFICANT CHANGE UP (ref 22–29)
CO2 SERPL-SCNC: 25 MMOL/L — SIGNIFICANT CHANGE UP (ref 22–29)
CREAT SERPL-MCNC: 0.51 MG/DL — SIGNIFICANT CHANGE UP (ref 0.5–1.3)
CREAT SERPL-MCNC: 0.51 MG/DL — SIGNIFICANT CHANGE UP (ref 0.5–1.3)
EGFR: 127 ML/MIN/1.73M2 — SIGNIFICANT CHANGE UP
EGFR: 127 ML/MIN/1.73M2 — SIGNIFICANT CHANGE UP
FERRITIN SERPL-MCNC: 7 NG/ML — LOW (ref 15–150)
FERRITIN SERPL-MCNC: 7 NG/ML — LOW (ref 15–150)
GLUCOSE SERPL-MCNC: 81 MG/DL — SIGNIFICANT CHANGE UP (ref 70–99)
GLUCOSE SERPL-MCNC: 81 MG/DL — SIGNIFICANT CHANGE UP (ref 70–99)
HCT VFR BLD CALC: 26.8 % — LOW (ref 34.5–45)
HCT VFR BLD CALC: 26.8 % — LOW (ref 34.5–45)
HCT VFR BLD CALC: 27.4 % — LOW (ref 34.5–45)
HCT VFR BLD CALC: 27.4 % — LOW (ref 34.5–45)
HGB BLD-MCNC: 8.8 G/DL — LOW (ref 11.5–15.5)
HGB BLD-MCNC: 8.8 G/DL — LOW (ref 11.5–15.5)
HGB BLD-MCNC: 8.9 G/DL — LOW (ref 11.5–15.5)
HGB BLD-MCNC: 8.9 G/DL — LOW (ref 11.5–15.5)
IRON SATN MFR SERPL: 31 UG/DL — LOW (ref 37–145)
IRON SATN MFR SERPL: 31 UG/DL — LOW (ref 37–145)
IRON SATN MFR SERPL: 5 % — LOW (ref 14–50)
IRON SATN MFR SERPL: 5 % — LOW (ref 14–50)
MCHC RBC-ENTMCNC: 26.3 PG — LOW (ref 27–34)
MCHC RBC-ENTMCNC: 26.3 PG — LOW (ref 27–34)
MCHC RBC-ENTMCNC: 26.7 PG — LOW (ref 27–34)
MCHC RBC-ENTMCNC: 26.7 PG — LOW (ref 27–34)
MCHC RBC-ENTMCNC: 32.1 GM/DL — SIGNIFICANT CHANGE UP (ref 32–36)
MCHC RBC-ENTMCNC: 32.1 GM/DL — SIGNIFICANT CHANGE UP (ref 32–36)
MCHC RBC-ENTMCNC: 33.2 GM/DL — SIGNIFICANT CHANGE UP (ref 32–36)
MCHC RBC-ENTMCNC: 33.2 GM/DL — SIGNIFICANT CHANGE UP (ref 32–36)
MCV RBC AUTO: 80.5 FL — SIGNIFICANT CHANGE UP (ref 80–100)
MCV RBC AUTO: 80.5 FL — SIGNIFICANT CHANGE UP (ref 80–100)
MCV RBC AUTO: 82 FL — SIGNIFICANT CHANGE UP (ref 80–100)
MCV RBC AUTO: 82 FL — SIGNIFICANT CHANGE UP (ref 80–100)
PLATELET # BLD AUTO: 265 K/UL — SIGNIFICANT CHANGE UP (ref 150–400)
PLATELET # BLD AUTO: 265 K/UL — SIGNIFICANT CHANGE UP (ref 150–400)
PLATELET # BLD AUTO: 270 K/UL — SIGNIFICANT CHANGE UP (ref 150–400)
PLATELET # BLD AUTO: 270 K/UL — SIGNIFICANT CHANGE UP (ref 150–400)
POTASSIUM SERPL-MCNC: 3.8 MMOL/L — SIGNIFICANT CHANGE UP (ref 3.5–5.3)
POTASSIUM SERPL-MCNC: 3.8 MMOL/L — SIGNIFICANT CHANGE UP (ref 3.5–5.3)
POTASSIUM SERPL-SCNC: 3.8 MMOL/L — SIGNIFICANT CHANGE UP (ref 3.5–5.3)
POTASSIUM SERPL-SCNC: 3.8 MMOL/L — SIGNIFICANT CHANGE UP (ref 3.5–5.3)
PROT ?TM UR-MCNC: 19 MG/DL — HIGH (ref 0–12)
PROT ?TM UR-MCNC: 19 MG/DL — HIGH (ref 0–12)
PROT SERPL-MCNC: 6.3 G/DL — LOW (ref 6.6–8.7)
PROT SERPL-MCNC: 6.3 G/DL — LOW (ref 6.6–8.7)
PROT/CREAT UR-RTO: 0.1 RATIO — SIGNIFICANT CHANGE UP
PROT/CREAT UR-RTO: 0.1 RATIO — SIGNIFICANT CHANGE UP
RBC # BLD: 3.33 M/UL — LOW (ref 3.8–5.2)
RBC # BLD: 3.33 M/UL — LOW (ref 3.8–5.2)
RBC # BLD: 3.34 M/UL — LOW (ref 3.8–5.2)
RBC # BLD: 3.34 M/UL — LOW (ref 3.8–5.2)
RBC # FLD: 12.3 % — SIGNIFICANT CHANGE UP (ref 10.3–14.5)
RBC # FLD: 12.3 % — SIGNIFICANT CHANGE UP (ref 10.3–14.5)
RBC # FLD: 12.6 % — SIGNIFICANT CHANGE UP (ref 10.3–14.5)
RBC # FLD: 12.6 % — SIGNIFICANT CHANGE UP (ref 10.3–14.5)
SODIUM SERPL-SCNC: 139 MMOL/L — SIGNIFICANT CHANGE UP (ref 135–145)
SODIUM SERPL-SCNC: 139 MMOL/L — SIGNIFICANT CHANGE UP (ref 135–145)
TIBC SERPL-MCNC: 602 UG/DL — HIGH (ref 220–430)
TIBC SERPL-MCNC: 602 UG/DL — HIGH (ref 220–430)
TRANSFERRIN SERPL-MCNC: 421 MG/DL — HIGH (ref 192–382)
TRANSFERRIN SERPL-MCNC: 421 MG/DL — HIGH (ref 192–382)
WBC # BLD: 7.91 K/UL — SIGNIFICANT CHANGE UP (ref 3.8–10.5)
WBC # BLD: 7.91 K/UL — SIGNIFICANT CHANGE UP (ref 3.8–10.5)
WBC # BLD: 9.94 K/UL — SIGNIFICANT CHANGE UP (ref 3.8–10.5)
WBC # BLD: 9.94 K/UL — SIGNIFICANT CHANGE UP (ref 3.8–10.5)
WBC # FLD AUTO: 7.91 K/UL — SIGNIFICANT CHANGE UP (ref 3.8–10.5)
WBC # FLD AUTO: 7.91 K/UL — SIGNIFICANT CHANGE UP (ref 3.8–10.5)
WBC # FLD AUTO: 9.94 K/UL — SIGNIFICANT CHANGE UP (ref 3.8–10.5)
WBC # FLD AUTO: 9.94 K/UL — SIGNIFICANT CHANGE UP (ref 3.8–10.5)

## 2023-11-22 PROCEDURE — 99235 HOSP IP/OBS SAME DATE MOD 70: CPT | Mod: GC

## 2023-11-22 PROCEDURE — 99232 SBSQ HOSP IP/OBS MODERATE 35: CPT | Mod: GC

## 2023-11-22 PROCEDURE — 76815 OB US LIMITED FETUS(S): CPT | Mod: 26

## 2023-11-22 RX ORDER — IRON SUCROSE 20 MG/ML
200 INJECTION, SOLUTION INTRAVENOUS ONCE
Refills: 0 | Status: COMPLETED | OUTPATIENT
Start: 2023-11-22 | End: 2023-11-22

## 2023-11-22 RX ORDER — LABETALOL HCL 100 MG
1 TABLET ORAL
Qty: 87 | Refills: 0
Start: 2023-11-22 | End: 2023-12-20

## 2023-11-22 RX ORDER — ACETAMINOPHEN 500 MG
975 TABLET ORAL ONCE
Refills: 0 | Status: COMPLETED | OUTPATIENT
Start: 2023-11-22 | End: 2023-11-22

## 2023-11-22 RX ORDER — SODIUM CHLORIDE 9 MG/ML
500 INJECTION, SOLUTION INTRAVENOUS ONCE
Refills: 0 | Status: DISCONTINUED | OUTPATIENT
Start: 2023-11-22 | End: 2023-11-22

## 2023-11-22 RX ADMIN — Medication 1 TABLET(S): at 11:17

## 2023-11-22 RX ADMIN — Medication 975 MILLIGRAM(S): at 11:18

## 2023-11-22 RX ADMIN — IRON SUCROSE 110 MILLIGRAM(S): 20 INJECTION, SOLUTION INTRAVENOUS at 14:06

## 2023-11-22 RX ADMIN — Medication 200 MILLIGRAM(S): at 08:11

## 2023-11-22 RX ADMIN — Medication 975 MILLIGRAM(S): at 12:00

## 2023-11-22 NOTE — PROGRESS NOTE ADULT - PROBLEM SELECTOR PLAN 2
- Hx of cHTN regimen labetalol 200mg TID (7AM/3PM/11PM)  - Previously admitted for cHTN exacerbation vs cHTN with superimposed PEC on 11/9-11/12. Received betamethasone (11/9-11/10) and MgSO4 during prior admission.   - PIH labs wnl.

## 2023-11-22 NOTE — PROGRESS NOTE ADULT - ATTENDING COMMENTS
MFM - IUP at 26w4d admitted for management of chronic hypertension. No symptoms of preeclampsia.  Normotensive on outpatient dose of labetalol 200 mg TID. Laboratory evaluation without evidence of proteinuria or renal / hepatic dysfunction.  Iron deficiency noted and will treat with Venofer. FHRT reassuring for gestational age without contractions.  Patient offered trial of Procardia given once daily administration but declines.  States is able to take labetalol TID and is comfortable on this dose.  Had difficulty scheduling outpatient follow up and will assist in scheduling OB, MFM, and Hematology follow up. Stable for outpatient management.  Routine discharge instructions reviewed and all questions answered.   Cleo Stringer MD  Maternal Fetal Medicine

## 2023-11-22 NOTE — DISCHARGE NOTE ANTEPARTUM - HOSPITAL COURSE
Patient admitted for blood pressure monitoring in setting of chronic hypertension with a previous admission for possible preeclampsia versus hypertension exacerbation. Serial labwork within normal limits, no features at this time concerning for preeclampsia .Patient found to be anemic, given IV iron and will likely require IV iron and hematology referral outpatient. Patient asymptomatic. NST reactive, BPP 8/8, < 6.5, vertex, posterior placenta on ultrasound.

## 2023-11-22 NOTE — OB RN PATIENT PROFILE - TRANSFUSION REACTION, PREVIOUS, PROFILE
Nosebleeds Normal Treatment: I explained this is common when taking isotretinoin. I recommended saline mist in each nostril multiple times a day. If this worsens they will contact us. no

## 2023-11-22 NOTE — DISCHARGE NOTE ANTEPARTUM - PROVIDER TOKENS
PROVIDER:[TOKEN:[[id number]],SCHEDULEDAPPT:[12/04/2023],SCHEDULEDAPPTTIME:[10:30 AM],ESTABLISHEDPATIENT:[T]]

## 2023-11-22 NOTE — DISCHARGE NOTE ANTEPARTUM - PATIENT PORTAL LINK FT
You can access the FollowMyHealth Patient Portal offered by [facility] by registering at the following website: [email or web address]. By joining [facility]’s FollowMyHealth portal, you will also be able to view your health information using other applications (apps) compatible with our system.

## 2023-11-22 NOTE — DISCHARGE NOTE ANTEPARTUM - MEDICATION SUMMARY - MEDICATIONS TO TAKE
I will START or STAY ON the medications listed below when I get home from the hospital:    labetalol 200 mg oral tablet  -- 1 tab(s) by mouth every 8 hours  -- Indication: For for blood pressure    Prenatal Multivitamins with Folic Acid 1 mg oral tablet  -- 1 tab(s) by mouth once a day  -- Indication: For Continue home med

## 2023-11-22 NOTE — PROGRESS NOTE ADULT - PROBLEM SELECTOR PLAN 6
-8.9 upon admission  -may be contributing to fatigue  -will order iron studies with next labwork  -will consider IV vs PO iron

## 2023-11-22 NOTE — OB RN PATIENT PROFILE - PURPOSEFUL PROACTIVE ROUNDING
Health Maintenance Due   Topic Date Due    HIV Screening  Never done    Mammogram  Never done    COVID-19 Vaccine (3 - Booster for Pfizer series) 01/18/2022     Updates were requested from care everywhere.  Chart was reviewed for overdue Proactive [facility] Encounters (LENNIE) topics (CRS, Breast Cancer Screening, Eye exam)  Health Maintenance has been updated.  LINKS immunization registry triggered.  Immunizations were reconciled.     Patient

## 2023-11-22 NOTE — PROGRESS NOTE ADULT - SUBJECTIVE AND OBJECTIVE BOX
at 26w4d GA   HD#2  admitted for management of chronic hypertension  LMP: 2023  MAGALY: 2024    Prenatal course complicated by:  1. Limited PNC - 2 OB visits this pregnancy - at last OB visit with Dr Lyles, told she had a lot of protein in the urine  2. cHTN, on labetalol 200mg TID  3. Hx of uterine rupture  4. Hx of CSx2    Subjective:  Pt denies any current headaches, RUQ pain, epigastric pain and new-onset edema.   WHen she first presented, she had abdominal cramping that she reports improved after hydrating, not feeling any currently but just woke up  Pt denies vaginal bleeding, abdominal cramping or leakage of fluid. She endorses good fetal movement. She denies fevers, chills, nausea, vomiting. She denies shortness of breath, chest pain, and palpitations.    Vital Signs Last 24 Hrs  T(C): 36.7 (2023 00:32), Max: 36.7 (2023 00:32)  T(F): 98.1 (2023 00:32), Max: 98.1 (2023 00:32)  HR: 93 (2023 07:07) (78 - 109)  BP: 109/59 (2023 07:07) (88/51 - 144/76)  RR: 18 (2023 00:32) (17 - 18)  SpO2: 100% (2023 07:06) (92% - 100%)    Parameters below as of 2023 21:52  Patient On (Oxygen Delivery Method): room air      Gen: no acute distress  Pulm: no increased work of breathing  Abd: nontender; gravid  Ext: no calf tenderness; no swelling    Tracing: baseline 140, moderate variability, +accels, no decels  [place]: no ctx  SVE: deferred

## 2023-11-22 NOTE — DISCHARGE NOTE ANTEPARTUM - CARE PLAN
1 Principal Discharge DX:	Chronic hypertension in pregnancy  Assessment and plan of treatment:	Follow up with your OB for a blood pressure check in 1 week and bring a blood pressure log. Please buy a blood pressure cuff if you do not have one at home and take your blood pressure twice a day while at home and at rest. Continue any prescribed antihypertensive medication as long as blood pressures are above 100/60. You should call your doctor sooner if you develop changes in vision, headache refractory to pain medication, or upper abdominal pain. Please call sooner if there are any other concerns.  Secondary Diagnosis:	26 weeks gestation of pregnancy  Assessment and plan of treatment:	Patient to establish care with Dr. Ruiz, has first OB appointment scheduled 12/4 at 1045AM.  Secondary Diagnosis:	Polyhydramnios  Assessment and plan of treatment:	Patient during ultrasound on 11/10 was polyhydramnios, now resolved. BEENA 7, BPP 8/8, breech presentation. Patient to undergo further workup with OB  Secondary Diagnosis:	Anemia, antepartum, third trimester  Assessment and plan of treatment:	Patient with mild anemia consistent with iron deficiency anemia; iron panel was sent. received IV Iron.

## 2023-11-22 NOTE — OB RN PATIENT PROFILE - FALL HARM RISK - UNIVERSAL INTERVENTIONS
Bed in lowest position, wheels locked, appropriate side rails in place/Call bell, personal items and telephone in reach/Instruct patient to call for assistance before getting out of bed or chair/Non-slip footwear when patient is out of bed/[place] to call system/Physically safe environment - no spills, clutter or unnecessary equipment/Purposeful Proactive Rounding/Room/bathroom lighting operational, light cord in reach

## 2023-11-22 NOTE — DISCHARGE NOTE ANTEPARTUM - NS MD DC FALL RISK RISK
For information on Fall & Injury Prevention, visit: [email or web address] OR  [email or web address] OR  https://www.cdc.gov/steadi/patient.html

## 2023-11-22 NOTE — DISCHARGE NOTE ANTEPARTUM - PLAN OF CARE
Patient with mild anemia consistent with iron deficiency anemia; iron panel was sent. received IV Iron. Patient to establish care with Dr. Ruiz, has first OB appointment scheduled 12/4 at 1045AM. Patient during ultrasound on 11/10 was polyhydramnios, now resolved. BEENA 7, BPP 8/8, breech presentation. Patient to undergo further workup with OB Follow up with your OB for a blood pressure check in 1 week and bring a blood pressure log. Please buy a blood pressure cuff if you do not have one at home and take your blood pressure twice a day while at home and at rest. Continue any prescribed antihypertensive medication as long as blood pressures are above 100/60. You should call your doctor sooner if you develop changes in vision, headache refractory to pain medication, or upper abdominal pain. Please call sooner if there are any other concerns.

## 2023-11-22 NOTE — OB RN PATIENT PROFILE - FUNCTIONAL ASSESSMENT - BASIC MOBILITY SCORE HIDDEN
69y/o man with PMH of HTN, DM2, CVA and CAD s/p CABG and PM, was admitted today with high fever and generalized weakness and vomiting for the past few days. Blood cultures with MRSA 4/4 bottles, possible source is skin.     High fever  GPC bacteremia   Skin ulcers     -Follow up repeat blood culture  -Follow up daily WBC and fever curve  -TTE and then ASHELY since has pacemaker   -Vanco trough is undertherapeutic and he has high Creat. and low CrCl.   -stop vanco and start Daptomycin 550mg daily   -Will adjust the dose based on crcl.   Will follow. 24

## 2023-11-22 NOTE — OB RN PATIENT PROFILE - FUNCTIONAL ASSESSMENT - BASIC MOBILITY 6.
4-calculated by average/Not able to assess (calculate score using [facility] averaging method)

## 2023-11-22 NOTE — DISCHARGE NOTE ANTEPARTUM - CARE PROVIDER_API CALL
Rimpel, Katherinne  Obstetrics and Gynecology  [address] [place] [id number]  Phone: [phone]  Fax: [phone]  Established Patient  Scheduled Appointment: 12/04/2023 10:30 AM

## 2023-11-22 NOTE — PROGRESS NOTE ADULT - PROBLEM SELECTOR PLAN 1
- Limited PNC, only seen x2 in this pregnancy  - Rubella/Rubeola immune, HepB NR  - A1c 5.0  - US (11/10): vtx, post placenta, EFW 70%ile, MVP 8.8  - FHT reassuring  - Daily PNV.

## 2023-11-25 PROCEDURE — 83615 LACTATE (LD) (LDH) ENZYME: CPT

## 2023-11-25 PROCEDURE — 36415 COLL VENOUS BLD VENIPUNCTURE: CPT

## 2023-11-25 PROCEDURE — 84156 ASSAY OF PROTEIN URINE: CPT

## 2023-11-25 PROCEDURE — 84466 ASSAY OF TRANSFERRIN: CPT

## 2023-11-25 PROCEDURE — 82728 ASSAY OF FERRITIN: CPT

## 2023-11-25 PROCEDURE — 83540 ASSAY OF IRON: CPT

## 2023-11-25 PROCEDURE — 86850 RBC ANTIBODY SCREEN: CPT

## 2023-11-25 PROCEDURE — 86901 BLOOD TYPING SEROLOGIC RH(D): CPT

## 2023-11-25 PROCEDURE — 82570 ASSAY OF URINE CREATININE: CPT

## 2023-11-25 PROCEDURE — 83550 IRON BINDING TEST: CPT

## 2023-11-25 PROCEDURE — 84550 ASSAY OF BLOOD/URIC ACID: CPT

## 2023-11-25 PROCEDURE — 85027 COMPLETE CBC AUTOMATED: CPT

## 2023-11-25 PROCEDURE — 86900 BLOOD TYPING SEROLOGIC ABO: CPT

## 2023-11-25 PROCEDURE — 85730 THROMBOPLASTIN TIME PARTIAL: CPT

## 2023-11-25 PROCEDURE — 85384 FIBRINOGEN ACTIVITY: CPT

## 2023-11-25 PROCEDURE — 80053 COMPREHEN METABOLIC PANEL: CPT

## 2023-11-25 PROCEDURE — 85025 COMPLETE CBC W/AUTO DIFF WBC: CPT

## 2023-11-25 PROCEDURE — 85610 PROTHROMBIN TIME: CPT

## 2023-11-25 PROCEDURE — 81003 URINALYSIS AUTO W/O SCOPE: CPT

## 2023-12-04 ENCOUNTER — APPOINTMENT (OUTPATIENT)
Dept: OBGYN | Facility: CLINIC | Age: 33
End: 2023-12-04

## 2023-12-05 ENCOUNTER — APPOINTMENT (OUTPATIENT)
Dept: OBGYN | Facility: CLINIC | Age: 33
End: 2023-12-05
Payer: COMMERCIAL

## 2023-12-05 VITALS
WEIGHT: 201.4 LBS | BODY MASS INDEX: 37.06 KG/M2 | SYSTOLIC BLOOD PRESSURE: 122 MMHG | HEIGHT: 62 IN | DIASTOLIC BLOOD PRESSURE: 62 MMHG

## 2023-12-05 DIAGNOSIS — Z11.3 ENCOUNTER FOR SCREENING FOR INFECTIONS WITH A PREDOMINANTLY SEXUAL MODE OF TRANSMISSION: ICD-10-CM

## 2023-12-05 DIAGNOSIS — Z87.828 PERSONAL HISTORY OF OTHER (HEALED) PHYSICAL INJURY AND TRAUMA: ICD-10-CM

## 2023-12-05 DIAGNOSIS — O09.33 SUPERVISION OF PREGNANCY WITH INSUFFICIENT ANTENATAL CARE, THIRD TRIMESTER: ICD-10-CM

## 2023-12-05 PROCEDURE — 36415 COLL VENOUS BLD VENIPUNCTURE: CPT

## 2023-12-05 PROCEDURE — 0500F INITIAL PRENATAL CARE VISIT: CPT

## 2023-12-15 DIAGNOSIS — D64.9 ANEMIA, UNSPECIFIED: ICD-10-CM

## 2023-12-15 LAB
ABO + RH PNL BLD: NORMAL
ALBUMIN SERPL ELPH-MCNC: 3.5 G/DL
ALP BLD-CCNC: 101 U/L
ALT SERPL-CCNC: 15 U/L
ANION GAP SERPL CALC-SCNC: 15 MMOL/L
APPEARANCE: CLEAR
AST SERPL-CCNC: 19 U/L
BILIRUB SERPL-MCNC: 0.2 MG/DL
BILIRUBIN URINE: ABNORMAL
BLD GP AB SCN SERPL QL: NORMAL
BLOOD URINE: NEGATIVE
BUN SERPL-MCNC: 7 MG/DL
CALCIUM SERPL-MCNC: 8.6 MG/DL
CHLORIDE SERPL-SCNC: 102 MMOL/L
CO2 SERPL-SCNC: 23 MMOL/L
COLOR: YELLOW
CREAT SERPL-MCNC: 0.58 MG/DL
CREAT SPEC-SCNC: 433 MG/DL
CREAT/PROT UR: 0.1 RATIO
DRUG ABUSE PANEL-9, SERUM: NORMAL
EGFR: 123 ML/MIN/1.73M2
ESTIMATED AVERAGE GLUCOSE: 91 MG/DL
GLUCOSE 1H P 50 G GLC PO SERPL-MCNC: 109 MG/DL
GLUCOSE QUALITATIVE U: NEGATIVE
GLUCOSE SERPL-MCNC: 91 MG/DL
HBA1C MFR BLD HPLC: 4.8 %
HBV SURFACE AG SER QL: NONREACTIVE
HCV AB SER QL: NONREACTIVE
HCV S/CO RATIO: 0.06 S/CO
KETONES URINE: 15
LDH SERPL-CCNC: 170 U/L
LEUKOCYTE ESTERASE URINE: NEGATIVE
MEV IGG FLD QL IA: >300 AU/ML
MEV IGG+IGM SER-IMP: POSITIVE
NITRITE URINE: NEGATIVE
PH URINE: 6
POTASSIUM SERPL-SCNC: 3.8 MMOL/L
PROT SERPL-MCNC: 6 G/DL
PROT UR-MCNC: 58 MG/DL
PROTEIN URINE: 100
RUBV IGG FLD-ACNC: 2.4 INDEX
RUBV IGG SER-IMP: POSITIVE
SODIUM SERPL-SCNC: 140 MMOL/L
SPECIFIC GRAVITY URINE: >=1.03
URATE SERPL-MCNC: 5.9 MG/DL
UROBILINOGEN URINE: 1 (ref 0.2–?)

## 2023-12-18 ENCOUNTER — NON-APPOINTMENT (OUTPATIENT)
Age: 33
End: 2023-12-18

## 2023-12-18 LAB
BASOPHILS # BLD AUTO: 0.02 K/UL
BASOPHILS NFR BLD AUTO: 0.3 %
EOSINOPHIL # BLD AUTO: 0.06 K/UL
EOSINOPHIL NFR BLD AUTO: 0.9 %
HCT VFR BLD CALC: 30.9 %
HGB BLD-MCNC: 9.4 G/DL
IMM GRANULOCYTES NFR BLD AUTO: 0.6 %
LYMPHOCYTES # BLD AUTO: 1.21 K/UL
LYMPHOCYTES NFR BLD AUTO: 18.3 %
MAN DIFF?: NORMAL
MCHC RBC-ENTMCNC: 25.9 PG
MCHC RBC-ENTMCNC: 30.4 GM/DL
MCV RBC AUTO: 85.1 FL
MONOCYTES # BLD AUTO: 0.54 K/UL
MONOCYTES NFR BLD AUTO: 8.2 %
NEUTROPHILS # BLD AUTO: 4.75 K/UL
NEUTROPHILS NFR BLD AUTO: 71.7 %
PLATELET # BLD AUTO: 274 K/UL
RBC # BLD: 3.63 M/UL
RBC # FLD: 14.3 %
WBC # FLD AUTO: 6.62 K/UL

## 2023-12-19 ENCOUNTER — APPOINTMENT (OUTPATIENT)
Dept: ANTEPARTUM | Facility: CLINIC | Age: 33
End: 2023-12-19

## 2023-12-19 ENCOUNTER — APPOINTMENT (OUTPATIENT)
Dept: MATERNAL FETAL MEDICINE | Facility: CLINIC | Age: 33
End: 2023-12-19

## 2023-12-19 ENCOUNTER — NON-APPOINTMENT (OUTPATIENT)
Age: 33
End: 2023-12-19

## 2023-12-19 ENCOUNTER — APPOINTMENT (OUTPATIENT)
Dept: ANTEPARTUM | Facility: CLINIC | Age: 33
End: 2023-12-19
Payer: COMMERCIAL

## 2023-12-19 ENCOUNTER — ASOB RESULT (OUTPATIENT)
Age: 33
End: 2023-12-19

## 2023-12-19 ENCOUNTER — APPOINTMENT (OUTPATIENT)
Dept: OBGYN | Facility: CLINIC | Age: 33
End: 2023-12-19
Payer: COMMERCIAL

## 2023-12-19 ENCOUNTER — APPOINTMENT (OUTPATIENT)
Dept: MATERNAL FETAL MEDICINE | Facility: CLINIC | Age: 33
End: 2023-12-19
Payer: COMMERCIAL

## 2023-12-19 VITALS — DIASTOLIC BLOOD PRESSURE: 78 MMHG | BODY MASS INDEX: 37.31 KG/M2 | SYSTOLIC BLOOD PRESSURE: 140 MMHG | WEIGHT: 204 LBS

## 2023-12-19 VITALS
HEART RATE: 68 BPM | SYSTOLIC BLOOD PRESSURE: 132 MMHG | DIASTOLIC BLOOD PRESSURE: 80 MMHG | HEIGHT: 62 IN | RESPIRATION RATE: 16 BRPM | BODY MASS INDEX: 37.17 KG/M2 | OXYGEN SATURATION: 99 % | WEIGHT: 202 LBS

## 2023-12-19 DIAGNOSIS — Z90.3 ACQUIRED ABSENCE OF STOMACH [PART OF]: ICD-10-CM

## 2023-12-19 DIAGNOSIS — O09.891 SUPERVISION OF OTHER HIGH RISK PREGNANCIES, FIRST TRIMESTER: ICD-10-CM

## 2023-12-19 DIAGNOSIS — Z82.49 FAMILY HISTORY OF ISCHEMIC HEART DISEASE AND OTHER DISEASES OF THE CIRCULATORY SYSTEM: ICD-10-CM

## 2023-12-19 DIAGNOSIS — Z98.891 HISTORY OF UTERINE SCAR FROM PREVIOUS SURGERY: ICD-10-CM

## 2023-12-19 DIAGNOSIS — Z92.89 PERSONAL HISTORY OF OTHER MEDICAL TREATMENT: ICD-10-CM

## 2023-12-19 LAB
APPEARANCE: CLEAR
BILIRUBIN URINE: ABNORMAL
BLOOD URINE: NEGATIVE
COLOR: YELLOW
GLUCOSE QUALITATIVE U: NEGATIVE
KETONES URINE: 40
LEUKOCYTE ESTERASE URINE: ABNORMAL
NITRITE URINE: NEGATIVE
PH URINE: 6
PROTEIN URINE: 30
SPECIFIC GRAVITY URINE: >=1.03
UROBILINOGEN URINE: 0.2 (ref 0.2–?)

## 2023-12-19 PROCEDURE — 76818 FETAL BIOPHYS PROFILE W/NST: CPT

## 2023-12-19 PROCEDURE — 99204 OFFICE O/P NEW MOD 45 MIN: CPT

## 2023-12-19 PROCEDURE — 99214 OFFICE O/P EST MOD 30 MIN: CPT

## 2023-12-19 PROCEDURE — 76820 UMBILICAL ARTERY ECHO: CPT

## 2023-12-19 PROCEDURE — 0502F SUBSEQUENT PRENATAL CARE: CPT

## 2023-12-19 RX ORDER — AMLODIPINE BESYLATE 10 MG/1
10 TABLET ORAL DAILY
Qty: 30 | Refills: 5 | Status: DISCONTINUED | COMMUNITY
Start: 2020-10-05 | End: 2023-12-19

## 2023-12-19 NOTE — DISCUSSION/SUMMARY
[FreeTextEntry1] : Dear Dr. Abdi,   We had the pleasure of seeing your patient, KRIS BACON, for Maternal Fetal Medicine consultation on Dec 19, 2023. As you know, she is a  at 30 weeks 3 days referred for a history of chronic hypertension and history of uterine rupture. She is feeling overall well today and without complaints.  The patient was counseled regarding the following topics:  1. Chronic hypertension Ms. Foley reports that her 2 pregnancies were complicated by gestational hypertension. She has been controlled on Labetalol BID outside of pregnancy. She was admitted to [facility] in early pregnancy for evaluation of suspected hypertension exacerbation versus superimposed preeclampsia. She has not been consistent with ambulatory blood pressure monitoring. Her dose of labetalol was increased to TID while in the hospital. She has not been consistently taking her 2nd dose. She had routine labs drawn at the start of her pregnancy. These were reviewed from outside records and they are within normal limits. She had a baseline 24 hr urine protein collection which was 120 mg.   We reviewed the maternal and fetal risks of chronic hypertension in pregnancy including, but not limited to, superimposed preeclampsia, placental abruption,  birth, gestational diabetes,  delivery, cerebrovascular events, pulmonary edema, renal failure, congenital anomalies, fetal growth restriction and stillbirth. Women with chronic hypertension should have a baseline assessment for end-organ damage such as an echocardiogram and a baseline urine protein:creatinine ratio to assess for kidney function. She has a blood pressure monitor at home and checking her blood pressure at least 2 times daily was suggested. She has not seen a cardiologist in over 1 year.  She will ultimately require fetal growth surveillance and  testing in the third trimester. Delivery timing will be assessed based on maternal blood pressure control, if superimposed preeclampsia develops or if there are any fetal status abnormalities.  2. History of uterine rupture Ms. Foley reports that her first pregnancy was complicated by a uterine rupture. She has lapses in her memory of the event and it was when she was 19 years old. She stated she was admitted for induction of labor for gestational hypertension. She had 2 cervidils and pitocin. Her membranes were artificially ruptured and the fluid was noted to be meconium stained. She was told that her contractions were not sufficient and she required a CD. She was ultimately converted to general anesthesia and later informed that she had a uterine rupture or dehiscence and received 3 units of PRBC. She denies any prior uterine surgeries.  In , she was scheduled for a repeat elective  delivery at 37 weeks due to her history of uterine rupture. On review of the operative report, there were adhesions of the uterus to the anterior and posterior uterine walls. The delivery was uncomplicated and the patient did not require transfusion.  We discussed the risks of recurrence of rupture and recommended again delivery at 37 weeks gestation. She is aware of the risk of contractions or  labor in the setting of prior rupture. Given the increased risk of subsequent surgeries, with the additional risk of her history, we discussed optimization of hemoglobin throughout gestation. She was noted to have anemia while inpatient and has not yet seen a hematologist. We reviewed the risks of anemia in pregnancy and the need to maintain appropriate hemoglobin levels as the pregnancy continues. I encouraged Ms. Bacon to call to make her appointment with Hematology.   Her pregnancy is also complicated by failed termination in the first trimester and history of gastric sleeve placed in . She is a late transfer of care from OB team at [facility].   Summary of Recommendations: - Ambulatory blood pressure surveillance - Return to cardiologist for echocardiogram - serial growth surveillance - Antepartum testing starting at 32 weeks gestation - Scheduled repeat elective  delivery at 37 weeks gestation. The patient desires permanent sterilization.  Thank you for requesting a consultation on this patient. The total time spent in preparation for this visit, medical history taking, orders, review of records, counseling the patient, and writing this note was 60 minutes.   At the end of our discussion, the patient indicated that her questions were answered and she seemed satisfied with our discussion. Please do not hesitate to contact us with any questions.   Sincerely,     Jaye Logan MD FACOG Maternal-Fetal Medicine

## 2023-12-27 ENCOUNTER — NON-APPOINTMENT (OUTPATIENT)
Age: 33
End: 2023-12-27

## 2023-12-27 ENCOUNTER — APPOINTMENT (OUTPATIENT)
Dept: OBGYN | Facility: CLINIC | Age: 33
End: 2023-12-27
Payer: COMMERCIAL

## 2023-12-27 VITALS
HEIGHT: 62 IN | BODY MASS INDEX: 37.17 KG/M2 | DIASTOLIC BLOOD PRESSURE: 80 MMHG | SYSTOLIC BLOOD PRESSURE: 126 MMHG | WEIGHT: 202 LBS

## 2023-12-27 LAB
APPEARANCE: CLEAR
BILIRUBIN URINE: ABNORMAL
BLOOD URINE: NEGATIVE
COLOR: YELLOW
GLUCOSE QUALITATIVE U: NEGATIVE
KETONES URINE: 160
LEUKOCYTE ESTERASE URINE: ABNORMAL
NITRITE URINE: NEGATIVE
PH URINE: 5.5
PROTEIN URINE: 100
SPECIFIC GRAVITY URINE: >=1.03
UROBILINOGEN URINE: 0.2 (ref 0.2–?)

## 2023-12-27 PROCEDURE — 90471 IMMUNIZATION ADMIN: CPT

## 2023-12-27 PROCEDURE — 90715 TDAP VACCINE 7 YRS/> IM: CPT

## 2023-12-27 PROCEDURE — 0502F SUBSEQUENT PRENATAL CARE: CPT

## 2024-01-04 ENCOUNTER — APPOINTMENT (OUTPATIENT)
Dept: ANTEPARTUM | Facility: CLINIC | Age: 34
End: 2024-01-04
Payer: COMMERCIAL

## 2024-01-04 ENCOUNTER — ASOB RESULT (OUTPATIENT)
Age: 34
End: 2024-01-04

## 2024-01-04 PROCEDURE — 76820 UMBILICAL ARTERY ECHO: CPT

## 2024-01-04 PROCEDURE — 76818 FETAL BIOPHYS PROFILE W/NST: CPT

## 2024-01-11 ENCOUNTER — ASOB RESULT (OUTPATIENT)
Age: 34
End: 2024-01-11

## 2024-01-11 ENCOUNTER — APPOINTMENT (OUTPATIENT)
Dept: ANTEPARTUM | Facility: CLINIC | Age: 34
End: 2024-01-11
Payer: COMMERCIAL

## 2024-01-11 PROCEDURE — 76820 UMBILICAL ARTERY ECHO: CPT

## 2024-01-11 PROCEDURE — 76818 FETAL BIOPHYS PROFILE W/NST: CPT

## 2024-01-18 ENCOUNTER — NON-APPOINTMENT (OUTPATIENT)
Age: 34
End: 2024-01-18

## 2024-01-18 ENCOUNTER — APPOINTMENT (OUTPATIENT)
Dept: ANTEPARTUM | Facility: CLINIC | Age: 34
End: 2024-01-18

## 2024-01-18 ENCOUNTER — APPOINTMENT (OUTPATIENT)
Dept: OBGYN | Facility: CLINIC | Age: 34
End: 2024-01-18
Payer: COMMERCIAL

## 2024-01-18 ENCOUNTER — APPOINTMENT (OUTPATIENT)
Dept: ANTEPARTUM | Facility: CLINIC | Age: 34
End: 2024-01-18
Payer: COMMERCIAL

## 2024-01-18 ENCOUNTER — ASOB RESULT (OUTPATIENT)
Age: 34
End: 2024-01-18

## 2024-01-18 DIAGNOSIS — I10 ESSENTIAL (PRIMARY) HYPERTENSION: ICD-10-CM

## 2024-01-18 LAB
APPEARANCE: CLEAR
BILIRUBIN URINE: NEGATIVE
BLOOD URINE: NEGATIVE
COLOR: YELLOW
GLUCOSE QUALITATIVE U: NEGATIVE
KETONES URINE: NEGATIVE
LEUKOCYTE ESTERASE URINE: ABNORMAL
NITRITE URINE: POSITIVE
PH URINE: 7
PROTEIN URINE: NEGATIVE
SPECIFIC GRAVITY URINE: 1.02
UROBILINOGEN URINE: 0.2 (ref 0.2–?)

## 2024-01-18 PROCEDURE — ZZZZZ: CPT

## 2024-01-18 PROCEDURE — 0502F SUBSEQUENT PRENATAL CARE: CPT

## 2024-01-18 PROCEDURE — 76820 UMBILICAL ARTERY ECHO: CPT | Mod: 59

## 2024-01-18 PROCEDURE — 36415 COLL VENOUS BLD VENIPUNCTURE: CPT

## 2024-01-18 PROCEDURE — 76818 FETAL BIOPHYS PROFILE W/NST: CPT

## 2024-01-18 PROCEDURE — 76816 OB US FOLLOW-UP PER FETUS: CPT

## 2024-01-22 ENCOUNTER — NON-APPOINTMENT (OUTPATIENT)
Age: 34
End: 2024-01-22

## 2024-01-22 DIAGNOSIS — N39.0 URINARY TRACT INFECTION, SITE NOT SPECIFIED: ICD-10-CM

## 2024-01-22 LAB
ALBUMIN SERPL ELPH-MCNC: 3.6 G/DL
ALP BLD-CCNC: 163 U/L
ALT SERPL-CCNC: 11 U/L
ANION GAP SERPL CALC-SCNC: 14 MMOL/L
AST SERPL-CCNC: 15 U/L
B-HEM STREP SPEC QL CULT: NORMAL
BACTERIA UR CULT: ABNORMAL
BILIRUB SERPL-MCNC: 0.3 MG/DL
BUN SERPL-MCNC: 5 MG/DL
CALCIUM SERPL-MCNC: 9.2 MG/DL
CHLORIDE SERPL-SCNC: 100 MMOL/L
CO2 SERPL-SCNC: 24 MMOL/L
CREAT SERPL-MCNC: 0.65 MG/DL
CREAT SPEC-SCNC: 89 MG/DL
CREAT/PROT UR: 0.2 RATIO
EGFR: 120 ML/MIN/1.73M2
GLUCOSE SERPL-MCNC: 47 MG/DL
HCT VFR BLD CALC: 32.7 %
HGB BLD-MCNC: 9.8 G/DL
HIV1+2 AB SPEC QL IA.RAPID: NONREACTIVE
MCHC RBC-ENTMCNC: 24.7 PG
MCHC RBC-ENTMCNC: 30 GM/DL
MCV RBC AUTO: 82.4 FL
PLATELET # BLD AUTO: 312 K/UL
POTASSIUM SERPL-SCNC: 4.5 MMOL/L
PROT SERPL-MCNC: 6.3 G/DL
PROT UR-MCNC: 17 MG/DL
RBC # BLD: 3.97 M/UL
RBC # FLD: 13.6 %
SODIUM SERPL-SCNC: 138 MMOL/L
T PALLIDUM AB SER QL IA: NEGATIVE
WBC # FLD AUTO: 8.1 K/UL

## 2024-01-22 RX ORDER — NITROFURANTOIN (MONOHYDRATE/MACROCRYSTALS) 25; 75 MG/1; MG/1
100 CAPSULE ORAL
Qty: 14 | Refills: 0 | Status: ACTIVE | COMMUNITY
Start: 2024-01-22 | End: 1900-01-01

## 2024-01-24 ENCOUNTER — NON-APPOINTMENT (OUTPATIENT)
Age: 34
End: 2024-01-24

## 2024-01-24 ENCOUNTER — APPOINTMENT (OUTPATIENT)
Dept: OBGYN | Facility: CLINIC | Age: 34
End: 2024-01-24
Payer: COMMERCIAL

## 2024-01-24 VITALS
HEIGHT: 62 IN | WEIGHT: 205 LBS | SYSTOLIC BLOOD PRESSURE: 124 MMHG | DIASTOLIC BLOOD PRESSURE: 82 MMHG | BODY MASS INDEX: 37.73 KG/M2

## 2024-01-24 DIAGNOSIS — I10 ESSENTIAL (PRIMARY) HYPERTENSION: ICD-10-CM

## 2024-01-24 LAB
APPEARANCE: CLEAR
BILIRUBIN URINE: ABNORMAL
BLOOD URINE: NEGATIVE
COLOR: YELLOW
GLUCOSE QUALITATIVE U: NEGATIVE
KETONES URINE: ABNORMAL
LEUKOCYTE ESTERASE URINE: ABNORMAL
NITRITE URINE: NEGATIVE
PH URINE: 6
PROTEIN URINE: 30
SPECIFIC GRAVITY URINE: 1.02
UROBILINOGEN URINE: 1 (ref 0.2–?)

## 2024-01-24 PROCEDURE — 0502F SUBSEQUENT PRENATAL CARE: CPT

## 2024-01-25 ENCOUNTER — APPOINTMENT (OUTPATIENT)
Dept: ANTEPARTUM | Facility: CLINIC | Age: 34
End: 2024-01-25

## 2024-02-01 ENCOUNTER — APPOINTMENT (OUTPATIENT)
Dept: OBGYN | Facility: CLINIC | Age: 34
End: 2024-02-01
Payer: COMMERCIAL

## 2024-02-01 ENCOUNTER — APPOINTMENT (OUTPATIENT)
Dept: ANTEPARTUM | Facility: CLINIC | Age: 34
End: 2024-02-01

## 2024-02-01 ENCOUNTER — APPOINTMENT (OUTPATIENT)
Dept: ANTEPARTUM | Facility: CLINIC | Age: 34
End: 2024-02-01
Payer: COMMERCIAL

## 2024-02-01 ENCOUNTER — ASOB RESULT (OUTPATIENT)
Age: 34
End: 2024-02-01

## 2024-02-01 ENCOUNTER — OUTPATIENT (OUTPATIENT)
Dept: OUTPATIENT SERVICES | Facility: HOSPITAL | Age: 34
LOS: 1 days | End: 2024-02-01
Payer: COMMERCIAL

## 2024-02-01 VITALS
BODY MASS INDEX: 37.87 KG/M2 | SYSTOLIC BLOOD PRESSURE: 120 MMHG | HEIGHT: 62 IN | WEIGHT: 205.8 LBS | DIASTOLIC BLOOD PRESSURE: 68 MMHG

## 2024-02-01 DIAGNOSIS — Z01.812 ENCOUNTER FOR PREPROCEDURAL LABORATORY EXAMINATION: ICD-10-CM

## 2024-02-01 DIAGNOSIS — Z98.89 OTHER SPECIFIED POSTPROCEDURAL STATES: Chronic | ICD-10-CM

## 2024-02-01 DIAGNOSIS — L05.91 PILONIDAL CYST WITHOUT ABSCESS: Chronic | ICD-10-CM

## 2024-02-01 DIAGNOSIS — Z34.93 ENCOUNTER FOR SUPERVISION OF NORMAL PREGNANCY, UNSPECIFIED, THIRD TRIMESTER: ICD-10-CM

## 2024-02-01 LAB
BASOPHILS # BLD AUTO: 0.03 K/UL — SIGNIFICANT CHANGE UP (ref 0–0.2)
BASOPHILS NFR BLD AUTO: 0.4 % — SIGNIFICANT CHANGE UP (ref 0–2)
BLD GP AB SCN SERPL QL: SIGNIFICANT CHANGE UP
COMMENT - BLOOD BANK: SIGNIFICANT CHANGE UP
EOSINOPHIL # BLD AUTO: 0.08 K/UL — SIGNIFICANT CHANGE UP (ref 0–0.5)
EOSINOPHIL NFR BLD AUTO: 1 % — SIGNIFICANT CHANGE UP (ref 0–6)
HCT VFR BLD CALC: 34.6 % — SIGNIFICANT CHANGE UP (ref 34.5–45)
HGB BLD-MCNC: 10.6 G/DL — LOW (ref 11.5–15.5)
IMM GRANULOCYTES NFR BLD AUTO: 0.6 % — SIGNIFICANT CHANGE UP (ref 0–0.9)
LYMPHOCYTES # BLD AUTO: 0.96 K/UL — LOW (ref 1–3.3)
LYMPHOCYTES # BLD AUTO: 11.9 % — LOW (ref 13–44)
MCHC RBC-ENTMCNC: 24.8 PG — LOW (ref 27–34)
MCHC RBC-ENTMCNC: 30.6 GM/DL — LOW (ref 32–36)
MCV RBC AUTO: 80.8 FL — SIGNIFICANT CHANGE UP (ref 80–100)
MONOCYTES # BLD AUTO: 0.67 K/UL — SIGNIFICANT CHANGE UP (ref 0–0.9)
MONOCYTES NFR BLD AUTO: 8.3 % — SIGNIFICANT CHANGE UP (ref 2–14)
NEUTROPHILS # BLD AUTO: 6.27 K/UL — SIGNIFICANT CHANGE UP (ref 1.8–7.4)
NEUTROPHILS NFR BLD AUTO: 77.8 % — HIGH (ref 43–77)
PLATELET # BLD AUTO: 266 K/UL — SIGNIFICANT CHANGE UP (ref 150–400)
RBC # BLD: 4.28 M/UL — SIGNIFICANT CHANGE UP (ref 3.8–5.2)
RBC # FLD: 18.5 % — HIGH (ref 10.3–14.5)
WBC # BLD: 8.06 K/UL — SIGNIFICANT CHANGE UP (ref 3.8–10.5)
WBC # FLD AUTO: 8.06 K/UL — SIGNIFICANT CHANGE UP (ref 3.8–10.5)

## 2024-02-01 PROCEDURE — 76820 UMBILICAL ARTERY ECHO: CPT

## 2024-02-01 PROCEDURE — 59025 FETAL NON-STRESS TEST: CPT

## 2024-02-01 PROCEDURE — 76819 FETAL BIOPHYS PROFIL W/O NST: CPT

## 2024-02-01 PROCEDURE — 59425 ANTEPARTUM CARE ONLY: CPT

## 2024-02-02 ENCOUNTER — TRANSCRIPTION ENCOUNTER (OUTPATIENT)
Age: 34
End: 2024-02-02

## 2024-02-02 PROCEDURE — 85025 COMPLETE CBC W/AUTO DIFF WBC: CPT

## 2024-02-02 PROCEDURE — 86922 COMPATIBILITY TEST ANTIGLOB: CPT

## 2024-02-02 PROCEDURE — 36415 COLL VENOUS BLD VENIPUNCTURE: CPT

## 2024-02-02 PROCEDURE — 86850 RBC ANTIBODY SCREEN: CPT

## 2024-02-02 PROCEDURE — 86900 BLOOD TYPING SEROLOGIC ABO: CPT

## 2024-02-02 PROCEDURE — 86901 BLOOD TYPING SEROLOGIC RH(D): CPT

## 2024-02-03 ENCOUNTER — RESULT REVIEW (OUTPATIENT)
Age: 34
End: 2024-02-03

## 2024-02-03 ENCOUNTER — INPATIENT (INPATIENT)
Facility: HOSPITAL | Age: 34
LOS: 1 days | Discharge: HOME | End: 2024-02-05
Attending: STUDENT IN AN ORGANIZED HEALTH CARE EDUCATION/TRAINING PROGRAM | Admitting: STUDENT IN AN ORGANIZED HEALTH CARE EDUCATION/TRAINING PROGRAM
Payer: COMMERCIAL

## 2024-02-03 ENCOUNTER — APPOINTMENT (OUTPATIENT)
Dept: OBGYN | Facility: HOSPITAL | Age: 34
End: 2024-02-03

## 2024-02-03 VITALS — HEART RATE: 96 BPM | DIASTOLIC BLOOD PRESSURE: 90 MMHG | SYSTOLIC BLOOD PRESSURE: 140 MMHG

## 2024-02-03 DIAGNOSIS — Z98.89 OTHER SPECIFIED POSTPROCEDURAL STATES: Chronic | ICD-10-CM

## 2024-02-03 DIAGNOSIS — L05.91 PILONIDAL CYST WITHOUT ABSCESS: Chronic | ICD-10-CM

## 2024-02-03 DIAGNOSIS — Z87.828 PERSONAL HISTORY OF OTHER (HEALED) PHYSICAL INJURY AND TRAUMA: ICD-10-CM

## 2024-02-03 LAB
ALBUMIN SERPL ELPH-MCNC: 3.6 G/DL — SIGNIFICANT CHANGE UP (ref 3.3–5.2)
ALP SERPL-CCNC: 177 U/L — HIGH (ref 40–120)
ALT FLD-CCNC: 10 U/L — SIGNIFICANT CHANGE UP
ANION GAP SERPL CALC-SCNC: 15 MMOL/L — SIGNIFICANT CHANGE UP (ref 5–17)
APPEARANCE UR: CLEAR — SIGNIFICANT CHANGE UP
APTT BLD: 29.8 SEC — SIGNIFICANT CHANGE UP (ref 24.5–35.6)
AST SERPL-CCNC: 14 U/L — SIGNIFICANT CHANGE UP
BACTERIA # UR AUTO: NEGATIVE /HPF — SIGNIFICANT CHANGE UP
BASOPHILS # BLD AUTO: 0.02 K/UL — SIGNIFICANT CHANGE UP (ref 0–0.2)
BASOPHILS NFR BLD AUTO: 0.3 % — SIGNIFICANT CHANGE UP (ref 0–2)
BILIRUB SERPL-MCNC: 0.3 MG/DL — LOW (ref 0.4–2)
BILIRUB UR-MCNC: NEGATIVE — SIGNIFICANT CHANGE UP
BLD GP AB SCN SERPL QL: SIGNIFICANT CHANGE UP
BUN SERPL-MCNC: 5.9 MG/DL — LOW (ref 8–20)
CALCIUM SERPL-MCNC: 9 MG/DL — SIGNIFICANT CHANGE UP (ref 8.4–10.5)
CAST: 2 /LPF — SIGNIFICANT CHANGE UP (ref 0–4)
CHLORIDE SERPL-SCNC: 102 MMOL/L — SIGNIFICANT CHANGE UP (ref 96–108)
CO2 SERPL-SCNC: 22 MMOL/L — SIGNIFICANT CHANGE UP (ref 22–29)
COLOR SPEC: SIGNIFICANT CHANGE UP
CREAT ?TM UR-MCNC: 271 MG/DL — SIGNIFICANT CHANGE UP
CREAT SERPL-MCNC: 0.53 MG/DL — SIGNIFICANT CHANGE UP (ref 0.5–1.3)
DIFF PNL FLD: ABNORMAL
EGFR: 126 ML/MIN/1.73M2 — SIGNIFICANT CHANGE UP
EOSINOPHIL # BLD AUTO: 0.11 K/UL — SIGNIFICANT CHANGE UP (ref 0–0.5)
EOSINOPHIL NFR BLD AUTO: 1.6 % — SIGNIFICANT CHANGE UP (ref 0–6)
FIBRINOGEN PPP-MCNC: 714 MG/DL — HIGH (ref 200–450)
GLUCOSE SERPL-MCNC: 77 MG/DL — SIGNIFICANT CHANGE UP (ref 70–99)
GLUCOSE UR QL: NEGATIVE MG/DL — SIGNIFICANT CHANGE UP
HCT VFR BLD CALC: 36.3 % — SIGNIFICANT CHANGE UP (ref 34.5–45)
HGB BLD-MCNC: 11.1 G/DL — LOW (ref 11.5–15.5)
IMM GRANULOCYTES NFR BLD AUTO: 0.6 % — SIGNIFICANT CHANGE UP (ref 0–0.9)
INR BLD: 1.02 RATIO — SIGNIFICANT CHANGE UP (ref 0.85–1.18)
KETONES UR-MCNC: >=160 MG/DL
LDH SERPL L TO P-CCNC: 139 U/L — SIGNIFICANT CHANGE UP (ref 98–192)
LEUKOCYTE ESTERASE UR-ACNC: NEGATIVE — SIGNIFICANT CHANGE UP
LYMPHOCYTES # BLD AUTO: 1.07 K/UL — SIGNIFICANT CHANGE UP (ref 1–3.3)
LYMPHOCYTES # BLD AUTO: 15.8 % — SIGNIFICANT CHANGE UP (ref 13–44)
MCHC RBC-ENTMCNC: 24.6 PG — LOW (ref 27–34)
MCHC RBC-ENTMCNC: 30.6 GM/DL — LOW (ref 32–36)
MCV RBC AUTO: 80.5 FL — SIGNIFICANT CHANGE UP (ref 80–100)
MONOCYTES # BLD AUTO: 0.78 K/UL — SIGNIFICANT CHANGE UP (ref 0–0.9)
MONOCYTES NFR BLD AUTO: 11.5 % — SIGNIFICANT CHANGE UP (ref 2–14)
NEUTROPHILS # BLD AUTO: 4.74 K/UL — SIGNIFICANT CHANGE UP (ref 1.8–7.4)
NEUTROPHILS NFR BLD AUTO: 70.2 % — SIGNIFICANT CHANGE UP (ref 43–77)
NITRITE UR-MCNC: NEGATIVE — SIGNIFICANT CHANGE UP
PH UR: 6.5 — SIGNIFICANT CHANGE UP (ref 5–8)
PLATELET # BLD AUTO: 276 K/UL — SIGNIFICANT CHANGE UP (ref 150–400)
POTASSIUM SERPL-MCNC: 3.6 MMOL/L — SIGNIFICANT CHANGE UP (ref 3.5–5.3)
POTASSIUM SERPL-SCNC: 3.6 MMOL/L — SIGNIFICANT CHANGE UP (ref 3.5–5.3)
PROT ?TM UR-MCNC: 43 MG/DL — HIGH (ref 0–12)
PROT SERPL-MCNC: 6.8 G/DL — SIGNIFICANT CHANGE UP (ref 6.6–8.7)
PROT UR-MCNC: 100 MG/DL
PROT/CREAT UR-RTO: 0.2 RATIO — SIGNIFICANT CHANGE UP
PROTHROM AB SERPL-ACNC: 11.3 SEC — SIGNIFICANT CHANGE UP (ref 9.5–13)
RBC # BLD: 4.51 M/UL — SIGNIFICANT CHANGE UP (ref 3.8–5.2)
RBC # FLD: 18.9 % — HIGH (ref 10.3–14.5)
RBC CASTS # UR COMP ASSIST: 81 /HPF — HIGH (ref 0–4)
SODIUM SERPL-SCNC: 139 MMOL/L — SIGNIFICANT CHANGE UP (ref 135–145)
SP GR SPEC: >1.03 — HIGH (ref 1–1.03)
SQUAMOUS # UR AUTO: 6 /HPF — HIGH (ref 0–5)
T PALLIDUM AB TITR SER: NEGATIVE — SIGNIFICANT CHANGE UP
URATE SERPL-MCNC: 5 MG/DL — SIGNIFICANT CHANGE UP (ref 2.4–5.7)
UROBILINOGEN FLD QL: 1 MG/DL — SIGNIFICANT CHANGE UP (ref 0.2–1)
WBC # BLD: 6.76 K/UL — SIGNIFICANT CHANGE UP (ref 3.8–10.5)
WBC # FLD AUTO: 6.76 K/UL — SIGNIFICANT CHANGE UP (ref 3.8–10.5)
WBC UR QL: 13 /HPF — HIGH (ref 0–5)

## 2024-02-03 PROCEDURE — 88302 TISSUE EXAM BY PATHOLOGIST: CPT | Mod: 26

## 2024-02-03 PROCEDURE — 88307 TISSUE EXAM BY PATHOLOGIST: CPT | Mod: 26

## 2024-02-03 PROCEDURE — 58611 LIGATE OVIDUCT(S) ADD-ON: CPT

## 2024-02-03 PROCEDURE — 59514 CESAREAN DELIVERY ONLY: CPT | Mod: U7

## 2024-02-03 RX ORDER — FAMOTIDINE 10 MG/ML
20 INJECTION INTRAVENOUS ONCE
Refills: 0 | Status: COMPLETED | OUTPATIENT
Start: 2024-02-03 | End: 2024-02-03

## 2024-02-03 RX ORDER — LABETALOL HCL 100 MG
200 TABLET ORAL ONCE
Refills: 0 | Status: COMPLETED | OUTPATIENT
Start: 2024-02-03 | End: 2024-02-03

## 2024-02-03 RX ORDER — OXYCODONE HYDROCHLORIDE 5 MG/1
5 TABLET ORAL
Refills: 0 | Status: COMPLETED | OUTPATIENT
Start: 2024-02-03 | End: 2024-02-10

## 2024-02-03 RX ORDER — ENOXAPARIN SODIUM 100 MG/ML
40 INJECTION SUBCUTANEOUS EVERY 24 HOURS
Refills: 0 | Status: DISCONTINUED | OUTPATIENT
Start: 2024-02-03 | End: 2024-02-05

## 2024-02-03 RX ORDER — IBUPROFEN 200 MG
600 TABLET ORAL EVERY 6 HOURS
Refills: 0 | Status: DISCONTINUED | OUTPATIENT
Start: 2024-02-03 | End: 2024-02-03

## 2024-02-03 RX ORDER — CITRIC ACID/SODIUM CITRATE 300-500 MG
30 SOLUTION, ORAL ORAL ONCE
Refills: 0 | Status: COMPLETED | OUTPATIENT
Start: 2024-02-03 | End: 2024-02-03

## 2024-02-03 RX ORDER — MAGNESIUM HYDROXIDE 400 MG/1
30 TABLET, CHEWABLE ORAL
Refills: 0 | Status: DISCONTINUED | OUTPATIENT
Start: 2024-02-03 | End: 2024-02-05

## 2024-02-03 RX ORDER — SODIUM CHLORIDE 9 MG/ML
1000 INJECTION, SOLUTION INTRAVENOUS
Refills: 0 | Status: DISCONTINUED | OUTPATIENT
Start: 2024-02-03 | End: 2024-02-05

## 2024-02-03 RX ORDER — SCOPALAMINE 1 MG/3D
1 PATCH, EXTENDED RELEASE TRANSDERMAL ONCE
Refills: 0 | Status: COMPLETED | OUTPATIENT
Start: 2024-02-03 | End: 2024-02-03

## 2024-02-03 RX ORDER — LABETALOL HCL 100 MG
200 TABLET ORAL THREE TIMES A DAY
Refills: 0 | Status: DISCONTINUED | OUTPATIENT
Start: 2024-02-03 | End: 2024-02-05

## 2024-02-03 RX ORDER — OXYTOCIN 10 UNIT/ML
333.33 VIAL (ML) INJECTION
Qty: 20 | Refills: 0 | Status: DISCONTINUED | OUTPATIENT
Start: 2024-02-03 | End: 2024-02-05

## 2024-02-03 RX ORDER — SODIUM CHLORIDE 9 MG/ML
1000 INJECTION, SOLUTION INTRAVENOUS ONCE
Refills: 0 | Status: COMPLETED | OUTPATIENT
Start: 2024-02-03 | End: 2024-02-03

## 2024-02-03 RX ORDER — TRANEXAMIC ACID 100 MG/ML
1000 INJECTION, SOLUTION INTRAVENOUS ONCE
Refills: 0 | Status: COMPLETED | OUTPATIENT
Start: 2024-02-03 | End: 2024-02-03

## 2024-02-03 RX ORDER — CEFAZOLIN SODIUM 1 G
2000 VIAL (EA) INJECTION ONCE
Refills: 0 | Status: DISCONTINUED | OUTPATIENT
Start: 2024-02-03 | End: 2024-02-03

## 2024-02-03 RX ORDER — TETANUS TOXOID, REDUCED DIPHTHERIA TOXOID AND ACELLULAR PERTUSSIS VACCINE, ADSORBED 5; 2.5; 8; 8; 2.5 [IU]/.5ML; [IU]/.5ML; UG/.5ML; UG/.5ML; UG/.5ML
0.5 SUSPENSION INTRAMUSCULAR ONCE
Refills: 0 | Status: DISCONTINUED | OUTPATIENT
Start: 2024-02-03 | End: 2024-02-05

## 2024-02-03 RX ORDER — KETOROLAC TROMETHAMINE 30 MG/ML
30 SYRINGE (ML) INJECTION EVERY 6 HOURS
Refills: 0 | Status: DISCONTINUED | OUTPATIENT
Start: 2024-02-03 | End: 2024-02-04

## 2024-02-03 RX ORDER — OXYCODONE HYDROCHLORIDE 5 MG/1
5 TABLET ORAL ONCE
Refills: 0 | Status: DISCONTINUED | OUTPATIENT
Start: 2024-02-03 | End: 2024-02-05

## 2024-02-03 RX ORDER — CHLORHEXIDINE GLUCONATE 213 G/1000ML
1 SOLUTION TOPICAL DAILY
Refills: 0 | Status: DISCONTINUED | OUTPATIENT
Start: 2024-02-03 | End: 2024-02-03

## 2024-02-03 RX ORDER — ACETAMINOPHEN 500 MG
975 TABLET ORAL ONCE
Refills: 0 | Status: COMPLETED | OUTPATIENT
Start: 2024-02-03 | End: 2024-02-03

## 2024-02-03 RX ORDER — LANOLIN
1 OINTMENT (GRAM) TOPICAL EVERY 6 HOURS
Refills: 0 | Status: DISCONTINUED | OUTPATIENT
Start: 2024-02-03 | End: 2024-02-05

## 2024-02-03 RX ORDER — ACETAMINOPHEN 500 MG
975 TABLET ORAL
Refills: 0 | Status: DISCONTINUED | OUTPATIENT
Start: 2024-02-03 | End: 2024-02-05

## 2024-02-03 RX ORDER — SODIUM CHLORIDE 9 MG/ML
1000 INJECTION, SOLUTION INTRAVENOUS
Refills: 0 | Status: DISCONTINUED | OUTPATIENT
Start: 2024-02-03 | End: 2024-02-03

## 2024-02-03 RX ORDER — DIPHENHYDRAMINE HCL 50 MG
25 CAPSULE ORAL EVERY 6 HOURS
Refills: 0 | Status: DISCONTINUED | OUTPATIENT
Start: 2024-02-03 | End: 2024-02-05

## 2024-02-03 RX ORDER — OXYTOCIN 10 UNIT/ML
333.33 VIAL (ML) INJECTION
Qty: 20 | Refills: 0 | Status: COMPLETED | OUTPATIENT
Start: 2024-02-03 | End: 2024-02-03

## 2024-02-03 RX ORDER — SIMETHICONE 80 MG/1
80 TABLET, CHEWABLE ORAL EVERY 4 HOURS
Refills: 0 | Status: DISCONTINUED | OUTPATIENT
Start: 2024-02-03 | End: 2024-02-05

## 2024-02-03 RX ORDER — CEFAZOLIN SODIUM 1 G
2000 VIAL (EA) INJECTION ONCE
Refills: 0 | Status: COMPLETED | OUTPATIENT
Start: 2024-02-03 | End: 2024-02-03

## 2024-02-03 RX ORDER — INFLUENZA VIRUS VACCINE 15; 15; 15; 15 UG/.5ML; UG/.5ML; UG/.5ML; UG/.5ML
0.5 SUSPENSION INTRAMUSCULAR ONCE
Refills: 0 | Status: DISCONTINUED | OUTPATIENT
Start: 2024-02-03 | End: 2024-02-05

## 2024-02-03 RX ADMIN — Medication 200 MILLIGRAM(S): at 14:29

## 2024-02-03 RX ADMIN — Medication 30 MILLIGRAM(S): at 17:44

## 2024-02-03 RX ADMIN — Medication 975 MILLIGRAM(S): at 06:30

## 2024-02-03 RX ADMIN — SODIUM CHLORIDE 125 MILLILITER(S): 9 INJECTION, SOLUTION INTRAVENOUS at 10:28

## 2024-02-03 RX ADMIN — Medication 975 MILLIGRAM(S): at 21:52

## 2024-02-03 RX ADMIN — Medication 200 MILLIGRAM(S): at 06:21

## 2024-02-03 RX ADMIN — Medication 30 MILLILITER(S): at 06:21

## 2024-02-03 RX ADMIN — ENOXAPARIN SODIUM 40 MILLIGRAM(S): 100 INJECTION SUBCUTANEOUS at 21:52

## 2024-02-03 RX ADMIN — SCOPALAMINE 1 PATCH: 1 PATCH, EXTENDED RELEASE TRANSDERMAL at 06:25

## 2024-02-03 RX ADMIN — Medication 2000 MILLIGRAM(S): at 09:10

## 2024-02-03 RX ADMIN — CHLORHEXIDINE GLUCONATE 1 APPLICATION(S): 213 SOLUTION TOPICAL at 06:30

## 2024-02-03 RX ADMIN — TRANEXAMIC ACID 220 MILLIGRAM(S): 100 INJECTION, SOLUTION INTRAVENOUS at 08:28

## 2024-02-03 RX ADMIN — Medication 200 MILLIGRAM(S): at 21:51

## 2024-02-03 RX ADMIN — SODIUM CHLORIDE 2000 MILLILITER(S): 9 INJECTION, SOLUTION INTRAVENOUS at 06:21

## 2024-02-03 RX ADMIN — Medication 975 MILLIGRAM(S): at 14:29

## 2024-02-03 RX ADMIN — Medication 1000 MILLIUNIT(S)/MIN: at 10:27

## 2024-02-03 RX ADMIN — FAMOTIDINE 20 MILLIGRAM(S): 10 INJECTION INTRAVENOUS at 08:25

## 2024-02-03 NOTE — OB RN INTRAOPERATIVE NOTE - NS_DURAMORPH_OBGYN_ALL_OB
"-- DO NOT REPLY / DO NOT REPLY ALL --  -- Message is from the [employer]--    COVID-19 Universal Screening: Positive    General Patient Message      Reason for Call: Patient tested positive again for COVID-19 on October 19 th, he was tested near the [facility] and need to know when will he be able to return back to work. Caller Information       Type Contact Phone    10/26/2020 08:41 AM CDT Phone (Incoming) Emmanuel Gutiérrez (Self) [phone] (H)          Alternative phone number: na    Turnaround time given to caller: ""This message will be sent to [facility] Provider's name]. The clinical team will fulfill your request as soon as they review your message. \""    " Yes

## 2024-02-03 NOTE — OB RN PATIENT PROFILE - FALL HARM RISK - UNIVERSAL INTERVENTIONS
Bed in lowest position, wheels locked, appropriate side rails in place/Call bell, personal items and telephone in reach/Instruct patient to call for assistance before getting out of bed or chair/Non-slip footwear when patient is out of bed/[place] to call system/Physically safe environment - no spills, clutter or unnecessary equipment/Purposeful Proactive Rounding/Room/bathroom lighting operational, light cord in reach

## 2024-02-03 NOTE — OB RN DELIVERY SUMMARY - NSSELHIDDEN_OBGYN_ALL_OB_FT
[NS_[email or web address]_OB_FT:[id number]],[NS_DeliveryAssist1_OBGYN_ALL_OB_FT:[id number]],[NS_DeliveryRN_OBGYN_ALL_OB_FT:[id number]]

## 2024-02-03 NOTE — OB PROVIDER DELIVERY SUMMARY - NSPROVIDERDELIVERYNOTE_OBGYN_ALL_OB_FT
Brief  Delivery Summary    Procedure: rLTCS and bilateral salpingectomy for prior CS, history of uterine rupture, chronic HTN, desire for sterilization   Findings: Viable female infant, apgars 9/9, weight 2920g, cephalic presentation. Grossly normal appearing uterus, fallopian tubes and ovaries. Dense omental/bowel adhesions to uterus anteriorly and posteriorly. Left fallopian tube densely adhered to the ovary and omentum. Right fallopian tube densely adhered to ovary.   Single layer uterine closure  SubQ skin closure  EBL: 743cc  UOP: 100cc  Fluids in OR: 1100cc  Complications: None Brief  Delivery Summary    Procedure: rLTCS and bilateral salpingectomy for prior CS, history of uterine rupture, chronic HTN, desire for sterilization   Findings: Viable female infant, apgars 9/9, weight 2920g, cephalic presentation. Grossly normal appearing uterus, fallopian tubes and ovaries. Dense omental/bowel adhesions to uterus anteriorly and posteriorly. Left fallopian tube densely adhered to the ovary and omentum. Right fallopian tube densely adhered to ovary.   Single layer uterine closure  SubQ skin closure  EBL: 743cc  UOP: 100cc  Fluids in OR: 1100cc  Complications: None  DICTATION#: [id number]

## 2024-02-03 NOTE — OB PROVIDER H&P - HISTORY OF PRESENT ILLNESS
33y  at 37w0d weeks GA by LMP consistent with 1st trimester sono who presents to L&D for repeat c/s for history of c/s and history of uterine rupture. Patient denies vaginal bleeding, contractions and leakage of fluid. She endorses good fetal movement. Denies fevers, chills, nausea, vomiting, chest pain, SOB, dizziness and headache. No other complaints at this time.   MAGALY:  24     LMP: 23        Pregnancy course:   CHTN, on labetalol 200mg TID   Anemia in pregnancy   Prior CS x2, h/o uterine rupture     Obhx: : 09 FT pCS c/b uterine rupture required blood transfusion, Caridad Harrison, Male, [id number]   G2: 14 FT rCS c/b gHTN, [facility], Female [id number]   Gynhx: denies cysts, fibroids, STIs, abn paps   Pmhx: anemia, HTN   Pshx: CSx2, gastric sleeve, cholecystectomy   Meds: PNV, labetalol 200mg TID   Allergies: NKDA   Social Hx: denies tobacco, alcohol, recreational drug use during this pregnancy     Ultrasound: vtx, fundal placenta   EFW: 2557g, 53%ile ()   BMI: 37.1     T(C): 36.9 (24 @ 05:54), Max: 36.9 (24 @ 05:54)  HR: 96 (24 @ 05:54) (96 - 96)  BP: 140/90 (24 @ 05:54) (140/90 - 140/90)  RR: 16 (24 @ 05:54) (16 - 16)  SpO2: --    Gen: NAD, well-appearing, AAOx3   Abd: Soft, gravid  Ext: non-tender, non-edematous  SSE:   SVE:    Bedside sono:  FHT:  [place]:       A/P:   -Admit to L&D  -Consent  -Admission labs  -NPO, except ice chips   -IV fluids  -Labor: Intact/*ROM. Latent/Active labor. Jeanie *.   -Fetus: Cat I tracing. Continuous toco and fetal monitoring.   -GBS: Negative, no GBS ppx required   -Analgesia:     Discussed with Dr. Garcia 33y  at 37w0d weeks GA by LMP consistent with 1st trimester sono who presents to L&D for repeat c/s for history of c/s and history of uterine rupture. Patient denies vaginal bleeding, contractions and leakage of fluid. She endorses good fetal movement. Denies fevers, chills, nausea, vomiting, chest pain, SOB, dizziness and headache. No other complaints at this time.   MAGALY:  24   LMP: 23       Pregnancy course:   CHTN, on labetalol 200mg TID   Anemia in pregnancy   Prior CS x2, h/o uterine rupture     Obhx: : 09 FT pCS c/b uterine rupture required blood transfusion, Caridad Harrison, Male, [id number]   G2: 14 FT rCS c/b gHTN, [facility], Female [id number]   Gynhx: denies cysts, fibroids, STIs, abn paps   Pmhx: anemia, HTN   Pshx: CSx2, gastric sleeve, cholecystectomy   Meds: PNV, labetalol 200mg TID, IV iron infusions  Allergies: NKDA   Social Hx: denies tobacco, alcohol, recreational drug use during this pregnancy     Ultrasound: vtx, fundal placenta   EFW: 2557g, 53%ile ()   BMI: 37.1

## 2024-02-03 NOTE — OB PROVIDER H&P - ASSESSMENT
A/P: 33y  at 37w0d weeks GA by LMP consistent with 1st trimester sono who presents to L&D for repeat c/s for history of c/s and history of uterine rupture  -Admit to L&D  -Consent  -cHTN: continue labetalol 200 TID  -Anemia: starting Hgb pending  -Admission labs  -NPO, except ice chips   -IV fluids  -repeat CS  -Fetus: Cat I tracing. Continuous toco and fetal monitoring.   -GBS: Negative, no GBS ppx required   -Analgesia: spinal in OR    Discussed with Dr. Schulz

## 2024-02-03 NOTE — OB PROVIDER DELIVERY SUMMARY - NSSELHIDDEN_OBGYN_ALL_OB_FT
[NS_[email or web address]_OB_FT:[id number]],[NS_[email or web address]_ALL_OB_FT:[id number]],[NS_DeliveryRN_OBGYN_ALL_OB_FT:[id number]]

## 2024-02-03 NOTE — OB PROVIDER H&P - ATTENDING COMMENTS
33y  at 37w0d weeks GA by LMP consistent with 1st trimester sono who presents to L&D for repeat c/s for history of c/s and history of uterine rupture    - patient consented for  delivery, repeat x2, discussed R/B, includin. risk of reaction to anesthesia  2. infection (for which we will be giving 2g Ancef preoperatively for and re-dosing as clinically indicated)   3. bleeding (for which we have type and crossed 2u pRBC and given TXA preoperatively)   4. damage to surrounding organs (ureters/bladder, bowel, adnexa, anterior abdominal wall, etc)   5. other indicated procedures including  hysterectomy in case of uncontrolled and life threatening hemorrhage unresponsive to other hemostatic measures     - patient consented for bilateral salpingectomy (tubal sterilization procedure), discussed risks includin. general risks of surgery (reaction to anesthesia, bleeding, infection, and damage to surrounding organs/tissues)  2. irreversible nature of procedure (conception would only be attainable through ART)   benefits includin. improved efficacy of total bilateral salpingectomy vs ligation/cautery   2. risk reducing intervention (decreasing lifetime risk of ovarian cancer by up to 40%)   [facility] tubal sterilization consent signed, [facility] sterilization consent signed and in chart.     - admission labs   - FHT: reactive and reassuring   - [place]: with irregular activity   - vertex   - GBS negative, no antibiotic prophylaxis indicated   - Ancef 2g prior to OR   - 2L IV hydration prior to OR   - Bicitra/Famotidine prior to OR   - TXA prior to OR   - anesthesia consult ongoing   - continuous EFM/[place] until delivery   - will proceed to OR, scheduled

## 2024-02-03 NOTE — OB RN DELIVERY SUMMARY - BABY A: DATE/TIME OF DELIVERY
Wound care RN (50 minutes):  Patient's wound care to right heel buttocks completed per orders to assist staff RN with patient's daily cares.  See flowsheet for details of selected assessment/treatment.  Staff RN Hadeel aware of wound care visit.    03-Feb-2024 09:25

## 2024-02-03 NOTE — OB RN PREOPERATIVE CHECKLIST - ASSESSMENT, HISTORY & PHYSICAL COMPLETED AND ON MEDICAL RECORD
From: Ariella Mckeon  To: Carrie Justin  Sent: 1/9/2023 11:27 AM CST  Subject: Mastitis    Hello!    I went to urgent care yesterday and was told I have mastitis with symptoms of redness, hard lump, warm to touch, pain, tingling, etc on my right breast. I had not had a fever however I did wear up with flu like symptoms last night body aches, sweating and shivers. She started me on an antibiotic and said to follow up with you if it does not get better. I did not ask her how long the medication will take to start to work. I’ve taken 3 doses so far. Could you please advise on how long I should wait before scheduling an appointment to see you if it is not improving. I’m primarily concerned about the lump not improving.     Ariella    done

## 2024-02-03 NOTE — OB PROVIDER H&P - NSHPPHYSICALEXAM_GEN_ALL_CORE
T(C): 36.9 (02-03-24 @ 05:54), Max: 36.9 (02-03-24 @ 05:54)  HR: 96 (02-03-24 @ 05:54) (96 - 96)  BP: 140/90 (02-03-24 @ 05:54) (140/90 - 140/90)  RR: 16 (02-03-24 @ 05:54) (16 - 16)    Gen: NAD, well-appearing, AAOx3   Abd: Soft, gravid  Ext: non-tender, non-edematous  SSE: deferred  SVE:  deferred  Bedside sono: vertex  FHT: baseline 140, moderate variability, +accels, -decels   [place]: no ctx seen

## 2024-02-03 NOTE — OB RN DELIVERY SUMMARY - NS_SEPSISRSKCALC_OBGYN_ALL_OB_FT
EOS calculated successfully. EOS Risk Factor: 0.5/1000 live births ([facility] national incidence); GA=37w;Temp=98.4; ROM=0.017; GBS='Negative'; Antibiotics='No antibiotics or any antibiotics < 2 hrs prior to birth'

## 2024-02-03 NOTE — OB RN INTRAOPERATIVE NOTE - NS_INDWELLINGURINARYCATHETERINSERTEDBY_OBGYN_ALL_OB_FT
[facility]  Ambulatory Clinic  Obstetrics & Gynecology    Visit Date:  10/3/2023    Chief Complaint:  Annual GYN exam    History of Present Illness:      Ritchie Lujan is a 40 y.o.  here for a gynecologic exam.      Pt has no major GYN complaints today.      Menses are light/few, not heavy or painful on depo provera.    Pt is tolerating depo provera and is interested in Nexplanon at the end of injection for convenience.      Last pap ~ was benign.    Pt denies abnormal vaginal bleeding, vaginal discharge, dysmenorrhea, dyspareunia, pelvic pain, bloating, early satiety, unintentional weight loss, breast mass/skin changes, incontinence, GI or urinary complaints.      Otherwise, the pt is in her usual state of health.    Past History:  Gynecologic history as noted above.    Review of Systems:      GENERAL:  No fever, fatigue, excessive weight gain or loss  HEENT:  No headaches, hearing changes, visual disturbance  RESPIRATORY:  No cough, shortness of breath  CARDIOVASCULAR:  No chest pain, heart palpitations, leg swelling  BREAST:  No lump, pain, nipple discharge, skin changes  GASTROINTESTINAL:  No nausea, vomiting, constipation, diarrhea, abd pain, rectal bleeding   GENITOURINARY:  See HPI  ENDOCRINE:  No heat or cold intolerance  HEMATOLOGIC:  No easy bruisability or bleeding   LYMPHATICS:  No enlarged nodes  MUSCULOSKELETAL:  No acute joint pain or swelling  SKIN:  No rash, lesions, jaundice  NEUROLOGIC:  No dizziness, weakness, syncope  PSYCHIATRIC:  No significant mood changes, homicidal/suicidal ideations, abuse    Physical Exam:     /84   Wt 77.6 kg (170 lb 15.5 oz)   BMI 27.59 kg/m²   Pulse 60's, Resp rate 14     GENERAL:  No acute distress, well-nourished  HEENT:  Atraumatic, anicteric, moist mucus membranes. Neck supple w/o masses.  BREAST:  Symmetric, nontender, no obvious masses, adenopathy, skin changes or nipple discharge.  LUNGS:  Clear normal  respiratory effort  HEART:  Regular rate and rhythm  ABDOMEN:  Soft, non-tender, non-distended, normoactive bowel sounds, no obvious organomegaly  EXT:  Symmetric w/o cramping, claudication, or edema. +2 distal pulses.  SKIN:  No rashes or bruising  PSYCH:  Mood and affect appropriate  NEURO:  Grossly intact bilaterally, no sensory or motor deficits     GENITOURINARY:    VULVAR:  Female external genitalia w/o obvious lesions. Female hair distribution. Normal urethral meatus. No gross lymphadenopathy.    VAGINA:  Normal vaginal mucosa. Good support. No obvious lesion. No discharge.  CERVIX:  No cervical motion tenderness, discharge, or obvious lesions.   UTERUS:  Small, non-tender, normal contour  ADNEXA:  No masses, non-tender    RECTAL:  Deferred. No obvious external lesions    Chaperone present for exam.    Assessment:     40 y.o. :    Well woman gynecologic exam  Family planning - depo provera, order Nexplanon    Plan:    A gynecologic health assessment was performed with age appropriate counseling.    Cervical cancer screening - pap obtained.    Screening mammogram starts at 39 y/o, pt advised to contact office for order.    We discussed in detail her contraceptive options.  After an extensive discussion, pt is requesting Nexplanon and will continue with one more depo provera injection today.  Risks, benefits, and alternatives to Nexplanon discussed.  Will order Nexplanon pending insurance benefits verification process.    Encourage healthy lifestyle modifications, monthly self breast exams, and f/u with PCP for health maintenance.    Will schedule pt for Nexplanon insertion once received by office at end of current depo provera injection.    Return sooner as needed.  All questions answered, pt voiced understanding.        Semaj Knight MD       Dr. Anaya

## 2024-02-03 NOTE — OB RN INTRAOPERATIVE NOTE - NSSELHIDDEN_OBGYN_ALL_OB_FT
[NS_DeliveryAttending1_OBGYN_ALL_OB_FT:[id number]],[NS_DeliveryAssist1_OBGYN_ALL_OB_FT:[id number]=],[NS_DeliveryRN_OBGYN_ALL_OB_FT:[id number]] [NS_DeliveryAttending1_OBGYN_ALL_OB_FT:[id number]],[NS_DeliveryAssist1_OBGYN_ALL_OB_FT:[id number]],[NS_DeliveryRN_OBGYN_ALL_OB_FT:[id number]] [NS_DeliveryAttending1_OBGYN_ALL_OB_FT:[id number]],[NS_DeliveryAssist1_OBGYN_ALL_OB_FT:[id number]],[NS_DeliveryRN_OBGYN_ALL_OB_FT:[id number]],[NS_DeliveryAssist2_OBGYN_ALL_OB_FT:[id number]]

## 2024-02-04 ENCOUNTER — TRANSCRIPTION ENCOUNTER (OUTPATIENT)
Age: 34
End: 2024-02-04

## 2024-02-04 LAB
BASOPHILS # BLD AUTO: 0.03 K/UL — SIGNIFICANT CHANGE UP (ref 0–0.2)
BASOPHILS NFR BLD AUTO: 0.3 % — SIGNIFICANT CHANGE UP (ref 0–2)
EOSINOPHIL # BLD AUTO: 0.11 K/UL — SIGNIFICANT CHANGE UP (ref 0–0.5)
EOSINOPHIL NFR BLD AUTO: 1.3 % — SIGNIFICANT CHANGE UP (ref 0–6)
HCT VFR BLD CALC: 25.6 % — LOW (ref 34.5–45)
HGB BLD-MCNC: 8.2 G/DL — LOW (ref 11.5–15.5)
IMM GRANULOCYTES NFR BLD AUTO: 0.5 % — SIGNIFICANT CHANGE UP (ref 0–0.9)
LYMPHOCYTES # BLD AUTO: 1.43 K/UL — SIGNIFICANT CHANGE UP (ref 1–3.3)
LYMPHOCYTES # BLD AUTO: 16.4 % — SIGNIFICANT CHANGE UP (ref 13–44)
MCHC RBC-ENTMCNC: 26 PG — LOW (ref 27–34)
MCHC RBC-ENTMCNC: 32 GM/DL — SIGNIFICANT CHANGE UP (ref 32–36)
MCV RBC AUTO: 81.3 FL — SIGNIFICANT CHANGE UP (ref 80–100)
MONOCYTES # BLD AUTO: 0.96 K/UL — HIGH (ref 0–0.9)
MONOCYTES NFR BLD AUTO: 11 % — SIGNIFICANT CHANGE UP (ref 2–14)
NEUTROPHILS # BLD AUTO: 6.15 K/UL — SIGNIFICANT CHANGE UP (ref 1.8–7.4)
NEUTROPHILS NFR BLD AUTO: 70.5 % — SIGNIFICANT CHANGE UP (ref 43–77)
PLATELET # BLD AUTO: 208 K/UL — SIGNIFICANT CHANGE UP (ref 150–400)
RBC # BLD: 3.15 M/UL — LOW (ref 3.8–5.2)
RBC # FLD: 19.4 % — HIGH (ref 10.3–14.5)
WBC # BLD: 8.72 K/UL — SIGNIFICANT CHANGE UP (ref 3.8–10.5)
WBC # FLD AUTO: 8.72 K/UL — SIGNIFICANT CHANGE UP (ref 3.8–10.5)

## 2024-02-04 RX ORDER — OXYCODONE HYDROCHLORIDE 5 MG/1
1 TABLET ORAL
Qty: 8 | Refills: 0
Start: 2024-02-04

## 2024-02-04 RX ORDER — IRON SUCROSE 20 MG/ML
200 INJECTION, SOLUTION INTRAVENOUS ONCE
Refills: 0 | Status: COMPLETED | OUTPATIENT
Start: 2024-02-04 | End: 2024-02-04

## 2024-02-04 RX ORDER — ACETAMINOPHEN 500 MG
3 TABLET ORAL
Qty: 60 | Refills: 0
Start: 2024-02-04 | End: 2024-02-08

## 2024-02-04 RX ORDER — LABETALOL HCL 100 MG
1 TABLET ORAL
Qty: 90 | Refills: 0
Start: 2024-02-04 | End: 2024-03-04

## 2024-02-04 RX ORDER — OXYCODONE HYDROCHLORIDE 5 MG/1
5 TABLET ORAL ONCE
Refills: 0 | Status: DISCONTINUED | OUTPATIENT
Start: 2024-02-04 | End: 2024-02-04

## 2024-02-04 RX ORDER — OXYCODONE HYDROCHLORIDE 5 MG/1
5 TABLET ORAL
Refills: 0 | Status: DISCONTINUED | OUTPATIENT
Start: 2024-02-04 | End: 2024-02-04

## 2024-02-04 RX ADMIN — MAGNESIUM HYDROXIDE 30 MILLILITER(S): 400 TABLET, CHEWABLE ORAL at 13:21

## 2024-02-04 RX ADMIN — Medication 975 MILLIGRAM(S): at 21:01

## 2024-02-04 RX ADMIN — OXYCODONE HYDROCHLORIDE 5 MILLIGRAM(S): 5 TABLET ORAL at 14:23

## 2024-02-04 RX ADMIN — Medication 975 MILLIGRAM(S): at 15:02

## 2024-02-04 RX ADMIN — Medication 200 MILLIGRAM(S): at 13:21

## 2024-02-04 RX ADMIN — Medication 975 MILLIGRAM(S): at 03:13

## 2024-02-04 RX ADMIN — IRON SUCROSE 110 MILLIGRAM(S): 20 INJECTION, SOLUTION INTRAVENOUS at 17:06

## 2024-02-04 RX ADMIN — OXYCODONE HYDROCHLORIDE 5 MILLIGRAM(S): 5 TABLET ORAL at 13:23

## 2024-02-04 RX ADMIN — Medication 200 MILLIGRAM(S): at 22:37

## 2024-02-04 RX ADMIN — Medication 975 MILLIGRAM(S): at 08:45

## 2024-02-04 RX ADMIN — Medication 200 MILLIGRAM(S): at 06:00

## 2024-02-04 NOTE — DISCHARGE NOTE OB - PLAN OF CARE
Take your blood pressure at home daily  Call your doctor if the top number (systolic) is 140 or higher, or if your bottom number(diastolic) is 90 or higher.   Keep a log of your blood pressures and bring with you to your postpartum visit. Please call your provider in 1 week. Take medications as directed, regular diet, activity as tolerated. Exclusive breast feeding for the first 6 months is recommended. Nothing per vagina for 6 weeks (incl. sex, douching, etc). If you have additional concerns, please inform your provider.

## 2024-02-04 NOTE — DISCHARGE NOTE OB - HOSPITAL COURSE
Patient underwent a  delivery, uncomplicated . Post-op course was complicated by acute blood loss anemia, received one dose of venofer, asymptomatic. Pain is well controlled with PRN medication. She has no difficulty with ambulation, voiding, or PO intake. Lab values and vital signs are within normal limits prior to discharge.

## 2024-02-04 NOTE — DISCHARGE NOTE OB - PATIENT PORTAL LINK FT
You can access the FollowMyHealth Patient Portal offered by [facility] by registering at the following website: [email or web address]. By joining [facility]’s FollowMyHealth portal, you will also be able to view your health information using other applications (apps) compatible with our system.

## 2024-02-04 NOTE — DISCHARGE NOTE OB - CARE PROVIDER_API CALL
Hugo Schulz  Obstetrics and Gynecology  [address] [address]  Phone: [phone]  Fax: [phone]  Follow Up Time: 2 weeks

## 2024-02-04 NOTE — DISCHARGE NOTE OB - MEDICATION SUMMARY - MEDICATIONS TO TAKE
I will START or STAY ON the medications listed below when I get home from the hospital:    Tylenol 325 mg oral tablet  -- 3 tab(s) by mouth every 6 hours  -- Indication: For pain    oxyCODONE 5 mg oral tablet  -- 1 tab(s) by mouth every 6 hours MDD: 4 tablets  -- Indication: For pain    labetalol 200 mg oral tablet  -- 1 tab(s) by mouth every 8 hours  -- Indication: For Hypertension    Prenatal Multivitamins with Folic Acid 1 mg oral tablet  -- 1 tab(s) by mouth once a day  -- Indication: For postpartum

## 2024-02-04 NOTE — DISCHARGE NOTE OB - CARE PLAN
Principal Discharge DX:	 delivery delivered  Assessment and plan of treatment:	Please call your provider in 1 week. Take medications as directed, regular diet, activity as tolerated. Exclusive breast feeding for the first 6 months is recommended. Nothing per vagina for 6 weeks (incl. sex, douching, etc). If you have additional concerns, please inform your provider.  Secondary Diagnosis:	Hypertension  Assessment and plan of treatment:	Take your blood pressure at home daily  Call your doctor if the top number (systolic) is 140 or higher, or if your bottom number(diastolic) is 90 or higher.   Keep a log of your blood pressures and bring with you to your postpartum visit.  Secondary Diagnosis:	Anemia due to acute blood loss   1

## 2024-02-05 VITALS
DIASTOLIC BLOOD PRESSURE: 81 MMHG | SYSTOLIC BLOOD PRESSURE: 131 MMHG | RESPIRATION RATE: 18 BRPM | TEMPERATURE: 98 F | HEART RATE: 84 BPM | OXYGEN SATURATION: 99 %

## 2024-02-05 PROCEDURE — 86901 BLOOD TYPING SEROLOGIC RH(D): CPT

## 2024-02-05 PROCEDURE — 85610 PROTHROMBIN TIME: CPT

## 2024-02-05 PROCEDURE — 80053 COMPREHEN METABOLIC PANEL: CPT

## 2024-02-05 PROCEDURE — 88302 TISSUE EXAM BY PATHOLOGIST: CPT

## 2024-02-05 PROCEDURE — 59050 FETAL MONITOR W/REPORT: CPT

## 2024-02-05 PROCEDURE — 88307 TISSUE EXAM BY PATHOLOGIST: CPT

## 2024-02-05 PROCEDURE — 81001 URINALYSIS AUTO W/SCOPE: CPT

## 2024-02-05 PROCEDURE — 83615 LACTATE (LD) (LDH) ENZYME: CPT

## 2024-02-05 PROCEDURE — 86900 BLOOD TYPING SEROLOGIC ABO: CPT

## 2024-02-05 PROCEDURE — 85730 THROMBOPLASTIN TIME PARTIAL: CPT

## 2024-02-05 PROCEDURE — 86780 TREPONEMA PALLIDUM: CPT

## 2024-02-05 PROCEDURE — 84156 ASSAY OF PROTEIN URINE: CPT

## 2024-02-05 PROCEDURE — 85384 FIBRINOGEN ACTIVITY: CPT

## 2024-02-05 PROCEDURE — 85025 COMPLETE CBC W/AUTO DIFF WBC: CPT

## 2024-02-05 PROCEDURE — 86850 RBC ANTIBODY SCREEN: CPT

## 2024-02-05 PROCEDURE — 84550 ASSAY OF BLOOD/URIC ACID: CPT

## 2024-02-05 PROCEDURE — 82570 ASSAY OF URINE CREATININE: CPT

## 2024-02-05 PROCEDURE — 36415 COLL VENOUS BLD VENIPUNCTURE: CPT

## 2024-02-05 RX ORDER — OXYCODONE HYDROCHLORIDE 5 MG/1
5 TABLET ORAL ONCE
Refills: 0 | Status: DISCONTINUED | OUTPATIENT
Start: 2024-02-05 | End: 2024-02-05

## 2024-02-05 RX ADMIN — Medication 975 MILLIGRAM(S): at 02:34

## 2024-02-05 RX ADMIN — Medication 975 MILLIGRAM(S): at 08:46

## 2024-02-05 RX ADMIN — Medication 200 MILLIGRAM(S): at 06:04

## 2024-02-05 RX ADMIN — Medication 975 MILLIGRAM(S): at 09:45

## 2024-02-05 NOTE — PROGRESS NOTE ADULT - ATTENDING COMMENTS
33y  now POD#1 s/p repeat  section with BS at 37 weeks gestation 2/2 cHTN and hx of uterine rupture  - currently with minimal bleeding/lochia only, no symptoms of anemia, post partum Hgb 8.2, consistent with acute blood loss anemia, plan for PO supplementation  - health female infant at bedside   - vital signs, lab results, and physical exam reassuring   - DVT PPX: ambulation/Lovenox  - anticipated discharge: continue inpatient care
33y  now POD#2 s/p repeat  section with BS at 37 weeks gestation 2/2 cHTN and hx of uterine rupture  - currently with minimal bleeding/lochia only, no symptoms of anemia, post partum Hgb 8.2, consistent with acute blood loss anemia, plan for PO supplementation  - health female infant at bedside   - vital signs, lab results, and physical exam reassuring   - DVT PPX: ambulation/Lovenox  - anticipated discharge: continue inpatient care, possible discharge later today if baby clearance and BP continue well controlled

## 2024-02-05 NOTE — PROGRESS NOTE ADULT - ASSESSMENT
A/P:   33y  now POD#1 s/p repeat  section with BS at 37 weeks gestation 2/2 cHTN and h/o uterine rupture, uncomplicated.  -Vital signs stable  -Hgb: 11.1 -> 8.2  -tolerating PO, bowel function nml   -Looney removed, pending TOV  -Advance care as tolerated   -Continue routine postpartum and postoperative care and education  -Healthy female infant  - DVT ppx: Lovenox ordered/ Ambulation encouraged. SCDs while in bed.   -Dispo: Patient to be discharged when meeting all postpartum and postoperative milestones and pending attending approval.  
A/P:   33y  now POD#2 s/p repeat  section with BS at 37 weeks gestation 2/2 cHTN and h/o uterine rupture, uncomplicated.  -Vital signs stable  -Hgb: 11.1 -> 8.2, received one dose of venofer, asymptomatic this morning  -cHTN, on labetalol 200mg TID. BPs overnight normotensive.   -tolerating PO, bowel function nml, voiding spontaneously  -Advance care as tolerated   -Continue routine postpartum and postoperative care and education  -Healthy female infant  - DVT ppx: Lovenox ordered/ Ambulation encouraged. SCDs while in bed.   -Dispo: Pt is stable for discharge home pending attending rounds

## 2024-02-05 NOTE — PROGRESS NOTE ADULT - SUBJECTIVE AND OBJECTIVE BOX
KRIS PA is a 33y  now POD#2 s/p repeat  section with BS at 37 weeks gestation 2/2 cHTN and h/o uterine rupture, uncomplicated.    S:    No acute events overnight. The patient has no complaints.  Pain controlled with current treatment regimen.   She is ambulating without difficulty and tolerating PO.   + flatus/+BM/+voiding  She endorses appropriate lochia, which is decreasing.    Denies HA, vision changes, RUQ pain    O:    Vital Signs Last 24 Hrs  T(C): 36.8 (2024 04:28), Max: 37.4 (2024 19:38)  T(F): 98.2 (2024 04:28), Max: 99.3 (2024 19:38)  HR: 68 (2024 04:28) (68 - 90)  BP: 136/82 (2024 04:28) (123/80 - 136/82)  RR: 18 (2024 04:28) (17 - 18)  SpO2: 96% (2024 04:28) (96% - 99%)  Parameters below as of 2024 04:28  Patient On (Oxygen Delivery Method): room air        Gen: NAD, AOx3  CV: RRR, S1/S2 present  Pulm: CTAB  Abdomen:  Soft, non-tender, non-distended  Incision: Clean/dry/intact   Uterus:  Fundus firm below umbilicus  VE:  Expected lochia  Ext:  Bilateral lower extremities non-tender and non-edematous            LABS                   8.2    8.72  )-----------( 208      ( 2024 04:59 )             25.6     02-03    139  |  102  |  5.9<L>  ----------------------------<  77  3.6   |  22.0  |  0.53    Ca    9.0      2024 06:00    TPro  6.8  /  Alb  3.6  /  TBili  0.3<L>  /  DBili  x   /  AST  14  /  ALT  10  /  AlkPhos  177<H>  02-03  
INTERVAL HPI/OVERNIGHT EVENTS:  33y Female s/p repeat c section/bilateral salpingectomy under spinal anesthesia with duramorph for post op analgesia on02/03/24     Vital Signs Last 24 Hrs  T(C): 37.4 (04 Feb 2024 19:38), Max: 37.4 (04 Feb 2024 19:38)  T(F): 99.3 (04 Feb 2024 19:38), Max: 99.3 (04 Feb 2024 19:38)  HR: 85 (04 Feb 2024 19:38) (62 - 90)  BP: 131/83 (04 Feb 2024 19:38) (115/68 - 136/65)  BP(mean): --  RR: 18 (04 Feb 2024 19:38) (17 - 18)  SpO2: 99% (04 Feb 2024 19:38) (97% - 99%)    Parameters below as of 04 Feb 2024 19:38  Patient On (Oxygen Delivery Method): room air            Patient's overall anesthesia satisfaction: Positive    Patients pain is well controlled with IT duramorph    No respiratory events overnight    No pruritis at this time    Patient doing well     No headache      No residual numbness or weakness, sensory and motor function intact.    No anesthetic complications or complaints noted or reported          .            
KRIS PA is a 33y  now POD#1 s/p repeat  section with BS at 37 weeks gestation 2/2 cHTN and h/o uterine rupture, uncomplicated.    S:    No acute events overnight.   The patient has no complaints.  Pain controlled with current treatment regimen.   She is ambulating without difficulty and tolerating PO.   + flatus/-BM. Looney is out, no void yet  She endorses appropriate lochia, which is decreasing.    She denies fevers, chills, nausea and vomiting.   She denies lightheadedness, dizziness, palpitations, chest pain and SOB.     O:    T(C): 36.3 (24 @ 04:32), Max: 36.8 (24 @ 19:47)  HR: 62 (24 @ 04:32) (60 - 86)  BP: 115/68 (24 @ 04:32) (92/78 - 132/72)  RR: 18 (24 @ 04:32) (15 - 21)  SpO2: 98% (24 @ 04:32) (97% - 100%)    Gen: NAD, AOx3  CV: RRR, S1/S2 present  Pulm: CTAB  Abdomen:  Soft, non-tender, non-distended  Incision: Clean/dry/intact with suture. Pressure dressing removed   Uterus:  Fundus firm below umbilicus  VE:  Expected lochia  Ext:  Bilateral lower extremities non-tender and non-edematous                          8.2    8.72  )-----------( 208      ( 2024 04:59 )             25.6         139  |  102  |  5.9<L>  ----------------------------<  77  3.6   |  22.0  |  0.53    Ca    9.0      2024 06:00    TPro  6.8  /  Alb  3.6  /  TBili  0.3<L>  /  DBili  x   /  AST  14  /  ALT  10  /  AlkPhos  177<H>

## 2024-02-06 ENCOUNTER — NON-APPOINTMENT (OUTPATIENT)
Age: 34
End: 2024-02-06

## 2024-02-07 ENCOUNTER — NON-APPOINTMENT (OUTPATIENT)
Age: 34
End: 2024-02-07

## 2024-02-08 ENCOUNTER — APPOINTMENT (OUTPATIENT)
Dept: ANTEPARTUM | Facility: CLINIC | Age: 34
End: 2024-02-08

## 2024-02-12 PROBLEM — Z34.93 THIRD TRIMESTER PREGNANCY: Status: ACTIVE | Noted: 2023-12-05

## 2024-02-12 LAB
APPEARANCE: CLEAR
BILIRUBIN URINE: ABNORMAL
BLOOD URINE: NEGATIVE
COLOR: YELLOW
GLUCOSE QUALITATIVE U: NEGATIVE
KETONES URINE: 15
LEUKOCYTE ESTERASE URINE: ABNORMAL
NITRITE URINE: NEGATIVE
PH URINE: 6
PROTEIN URINE: ABNORMAL
SPECIFIC GRAVITY URINE: >=1.03
UROBILINOGEN URINE: 0.2 (ref 0.2–?)

## 2024-02-14 ENCOUNTER — APPOINTMENT (OUTPATIENT)
Dept: OBGYN | Facility: CLINIC | Age: 34
End: 2024-02-14
Payer: COMMERCIAL

## 2024-02-14 VITALS
HEIGHT: 62 IN | BODY MASS INDEX: 34.96 KG/M2 | DIASTOLIC BLOOD PRESSURE: 82 MMHG | SYSTOLIC BLOOD PRESSURE: 122 MMHG | WEIGHT: 190 LBS

## 2024-02-14 PROCEDURE — 0503F POSTPARTUM CARE VISIT: CPT

## 2024-02-14 NOTE — HISTORY OF PRESENT ILLNESS
[FreeTextEntry1] : 32 y/o now  s/p rCS + BS at 37w on 2/3.  Delivery History: Uncomplicated surgical course, , Hgb 8.2 at time of discharge.  Female infant, 6lbs 6oz, APGARS 9/9.  Current Status: - Breast: breast and bottle feeding, reports no difficulty or issues with latching, denies any breast tenderness/discomfort/pain, denies nipple cracking/pain/bleeding/discharge. - Mood: denies any significant anxiety or depression related symptoms, feels well supported at home, feels confident and comfortable with infant care. - Lochia: minimal. Denies any dizziness, lightheadedness, feeling faint, SOB, or ABARCA. - Menses: has not resumed yet. Denies any abnormal/irregular/heavy bleeding. - Sexual intercourse: has not resumed. - Contraception: undecided, thinking about it, will f/u at next visit  - Last PAP: No pap on record.   Symptoms: no fever, chills, malaise, or myalgia. No issues with headaches. Reports no sleep or appetite disturbances. Denies any issues with voiding/BM.     PE:  Incision: incision intact, healing well; no bleeding/drainage; no signs of infection/inflammation; firm fundus below umbilicus   A/P:  32 y/o now  s/p rCS + BS at 37w on 2/3. - [place] screen negative, mood appropriate, feels well taking care of baby - discussed continuing prenatal vitamins - discussed continued BF for 6 months, exclusively if possible (benefits explained) - discussed adequate diet and physical activity  - Contraception: undecided, thinking about it, will f/u at next visit  - Last PAP: No pap on record.    She verbalized understanding and agreement with above counseling regarding differential diagnosis, evaluation, and plan.  She was given time for questions/concerns which were all answered to her apparent satisfaction. All designated lab work for today drawn in office.   RTO 4w for final PP visit

## 2024-02-15 ENCOUNTER — APPOINTMENT (OUTPATIENT)
Dept: ANTEPARTUM | Facility: CLINIC | Age: 34
End: 2024-02-15

## 2024-02-22 ENCOUNTER — APPOINTMENT (OUTPATIENT)
Dept: ANTEPARTUM | Facility: CLINIC | Age: 34
End: 2024-02-22

## 2024-02-26 ENCOUNTER — APPOINTMENT (OUTPATIENT)
Dept: CARDIOLOGY | Facility: CLINIC | Age: 34
End: 2024-02-26

## 2024-03-02 LAB — SURGICAL PATHOLOGY STUDY: SIGNIFICANT CHANGE UP

## 2024-03-14 ENCOUNTER — APPOINTMENT (OUTPATIENT)
Dept: OBGYN | Facility: CLINIC | Age: 34
End: 2024-03-14
Payer: COMMERCIAL

## 2024-03-14 VITALS
HEIGHT: 62 IN | SYSTOLIC BLOOD PRESSURE: 122 MMHG | BODY MASS INDEX: 35.43 KG/M2 | WEIGHT: 192.5 LBS | DIASTOLIC BLOOD PRESSURE: 80 MMHG

## 2024-03-14 DIAGNOSIS — Z90.79 ACQUIRED ABSENCE OF OTHER GENITAL ORGAN(S): ICD-10-CM

## 2024-03-14 DIAGNOSIS — Z98.891 HISTORY OF UTERINE SCAR FROM PREVIOUS SURGERY: ICD-10-CM

## 2024-03-14 DIAGNOSIS — Z09 ENCOUNTER FOR FOLLOW-UP EXAMINATION AFTER COMPLETED TREATMENT FOR CONDITIONS OTHER THAN MALIGNANT NEOPLASM: ICD-10-CM

## 2024-03-14 PROCEDURE — 0503F POSTPARTUM CARE VISIT: CPT

## 2024-04-13 PROBLEM — Z09 POSTOP CHECK: Status: ACTIVE | Noted: 2024-02-14 | Resolved: 2024-05-14

## 2024-04-13 PROBLEM — Z90.79 STATUS POST BILATERAL SALPINGECTOMY: Status: ACTIVE | Noted: 2024-02-14

## 2024-04-13 PROBLEM — Z98.891 S/P CESAREAN SECTION: Status: ACTIVE | Noted: 2024-02-14

## 2024-04-13 NOTE — PHYSICAL EXAM
[Appropriately responsive] : appropriately responsive [Alert] : alert [No Acute Distress] : no acute distress [Oriented x3] : oriented x3 [FreeTextEntry7] : healed well

## 2024-04-13 NOTE — DISCUSSION/SUMMARY
[FreeTextEntry1] : 32 y/o now  s/p rCS + BS at 37w on 2/3. - [employer] screen negative, mood appropriate, feels well taking care of baby - discussed continuing prenatal vitamins - discussed continued BF for 6 months, exclusively if possible (benefits explained) - discussed adequate diet and physical activity - Contraception: undecided, thinking about it, will f/u at next visit - Last PAP: last year, normal per patient   She verbalized understanding and agreement with above counseling regarding differential diagnosis, evaluation, and plan. She was given time for questions/concerns which were all answered to her apparent satisfaction. All designated lab work for today drawn in office.  RTO 1yr for final PP visit

## 2024-04-13 NOTE — HISTORY OF PRESENT ILLNESS
[Patient reported PAP Smear was normal] : Patient reported PAP Smear was normal [N] : Patient reports normal menses [Menarche Age: ____] : age at menarche was [unfilled] [No] : Patient does not have concerns regarding sex [Y] : Patient uses contraception [PapSmeardate] : 03/2023 [LMPDate] : 05/20/23 [PGHxTotal] : 3 [[facility]Term] : 3 [[facility] Centeriving] : 3 [FreeTextEntry1] : 05/20/23

## 2025-03-13 ENCOUNTER — APPOINTMENT (OUTPATIENT)
Dept: OBGYN | Facility: CLINIC | Age: 35
End: 2025-03-13

## 2025-03-20 NOTE — OB RN PATIENT PROFILE - INFANT HOME WITH MOTHER, OB PROFILE
Detail Level: Detailed
Bleaching Agents Recommendations: OTC Differin 0.1% gel QD
Detail Level: Generalized
yes

## 2025-03-26 ENCOUNTER — LABORATORY RESULT (OUTPATIENT)
Age: 35
End: 2025-03-26

## 2025-03-26 ENCOUNTER — APPOINTMENT (OUTPATIENT)
Dept: OBGYN | Facility: CLINIC | Age: 35
End: 2025-03-26
Payer: COMMERCIAL

## 2025-03-26 VITALS
SYSTOLIC BLOOD PRESSURE: 160 MMHG | WEIGHT: 212 LBS | HEIGHT: 62 IN | DIASTOLIC BLOOD PRESSURE: 82 MMHG | BODY MASS INDEX: 39.01 KG/M2

## 2025-03-26 DIAGNOSIS — Z01.419 ENCOUNTER FOR GYNECOLOGICAL EXAMINATION (GENERAL) (ROUTINE) W/OUT ABNORMAL FINDINGS: ICD-10-CM

## 2025-03-26 PROCEDURE — 99395 PREV VISIT EST AGE 18-39: CPT

## 2025-03-26 PROCEDURE — 36415 COLL VENOUS BLD VENIPUNCTURE: CPT

## 2025-03-26 PROCEDURE — 99459 PELVIC EXAMINATION: CPT

## 2025-04-06 LAB
C TRACH RRNA SPEC QL NAA+PROBE: NOT DETECTED
CYTOLOGY CVX/VAG DOC THIN PREP: ABNORMAL
HAV IGM SER QL: NONREACTIVE
HBV CORE IGM SER QL: NONREACTIVE
HBV SURFACE AG SER QL: NONREACTIVE
HCV AB SER QL: NONREACTIVE
HCV S/CO RATIO: 0.12 S/CO
HIV1+2 AB SPEC QL IA.RAPID: NONREACTIVE
HPV HIGH+LOW RISK DNA PNL CVX: DETECTED
N GONORRHOEA RRNA SPEC QL NAA+PROBE: NOT DETECTED
SOURCE TP AMPLIFICATION: NORMAL
T PALLIDUM AB SER QL IA: NEGATIVE

## 2025-04-09 ENCOUNTER — NON-APPOINTMENT (OUTPATIENT)
Age: 35
End: 2025-04-09

## 2025-06-03 ENCOUNTER — EMERGENCY (EMERGENCY)
Facility: HOSPITAL | Age: 35
LOS: 1 days | End: 2025-06-03
Attending: STUDENT IN AN ORGANIZED HEALTH CARE EDUCATION/TRAINING PROGRAM
Payer: COMMERCIAL

## 2025-06-03 VITALS
HEART RATE: 93 BPM | OXYGEN SATURATION: 98 % | DIASTOLIC BLOOD PRESSURE: 101 MMHG | RESPIRATION RATE: 16 BRPM | WEIGHT: 216.27 LBS | TEMPERATURE: 98 F | SYSTOLIC BLOOD PRESSURE: 169 MMHG

## 2025-06-03 DIAGNOSIS — L05.91 PILONIDAL CYST WITHOUT ABSCESS: Chronic | ICD-10-CM

## 2025-06-03 DIAGNOSIS — Z98.89 OTHER SPECIFIED POSTPROCEDURAL STATES: Chronic | ICD-10-CM

## 2025-06-03 LAB
ALBUMIN SERPL ELPH-MCNC: 4.1 G/DL — SIGNIFICANT CHANGE UP (ref 3.3–5.2)
ALP SERPL-CCNC: 86 U/L — SIGNIFICANT CHANGE UP (ref 40–120)
ALT FLD-CCNC: 14 U/L — SIGNIFICANT CHANGE UP
ANION GAP SERPL CALC-SCNC: 15 MMOL/L — SIGNIFICANT CHANGE UP (ref 5–17)
AST SERPL-CCNC: 15 U/L — SIGNIFICANT CHANGE UP
BASOPHILS # BLD AUTO: 0.05 K/UL — SIGNIFICANT CHANGE UP (ref 0–0.2)
BASOPHILS NFR BLD AUTO: 0.5 % — SIGNIFICANT CHANGE UP (ref 0–2)
BILIRUB SERPL-MCNC: 0.3 MG/DL — LOW (ref 0.4–2)
BUN SERPL-MCNC: 11.9 MG/DL — SIGNIFICANT CHANGE UP (ref 8–20)
CALCIUM SERPL-MCNC: 9.1 MG/DL — SIGNIFICANT CHANGE UP (ref 8.4–10.5)
CHLORIDE SERPL-SCNC: 101 MMOL/L — SIGNIFICANT CHANGE UP (ref 96–108)
CO2 SERPL-SCNC: 24 MMOL/L — SIGNIFICANT CHANGE UP (ref 22–29)
CREAT SERPL-MCNC: 0.66 MG/DL — SIGNIFICANT CHANGE UP (ref 0.5–1.3)
EGFR: 118 ML/MIN/1.73M2 — SIGNIFICANT CHANGE UP
EGFR: 118 ML/MIN/1.73M2 — SIGNIFICANT CHANGE UP
EOSINOPHIL # BLD AUTO: 0.29 K/UL — SIGNIFICANT CHANGE UP (ref 0–0.5)
EOSINOPHIL NFR BLD AUTO: 3 % — SIGNIFICANT CHANGE UP (ref 0–6)
GLUCOSE SERPL-MCNC: 89 MG/DL — SIGNIFICANT CHANGE UP (ref 70–99)
HCG SERPL-ACNC: <4 MIU/ML — SIGNIFICANT CHANGE UP
HCT VFR BLD CALC: 37.5 % — SIGNIFICANT CHANGE UP (ref 34.5–45)
HGB BLD-MCNC: 12.1 G/DL — SIGNIFICANT CHANGE UP (ref 11.5–15.5)
IMM GRANULOCYTES # BLD AUTO: 0.02 K/UL — SIGNIFICANT CHANGE UP (ref 0–0.07)
IMM GRANULOCYTES NFR BLD AUTO: 0.2 % — SIGNIFICANT CHANGE UP (ref 0–0.9)
LYMPHOCYTES # BLD AUTO: 1.86 K/UL — SIGNIFICANT CHANGE UP (ref 1–3.3)
LYMPHOCYTES NFR BLD AUTO: 19.4 % — SIGNIFICANT CHANGE UP (ref 13–44)
MAGNESIUM SERPL-MCNC: 2.1 MG/DL — SIGNIFICANT CHANGE UP (ref 1.6–2.6)
MCHC RBC-ENTMCNC: 26.9 PG — LOW (ref 27–34)
MCHC RBC-ENTMCNC: 32.3 G/DL — SIGNIFICANT CHANGE UP (ref 32–36)
MCV RBC AUTO: 83.3 FL — SIGNIFICANT CHANGE UP (ref 80–100)
MONOCYTES # BLD AUTO: 0.74 K/UL — SIGNIFICANT CHANGE UP (ref 0–0.9)
MONOCYTES NFR BLD AUTO: 7.7 % — SIGNIFICANT CHANGE UP (ref 2–14)
NEUTROPHILS # BLD AUTO: 6.65 K/UL — SIGNIFICANT CHANGE UP (ref 1.8–7.4)
NEUTROPHILS NFR BLD AUTO: 69.2 % — SIGNIFICANT CHANGE UP (ref 43–77)
NRBC # BLD AUTO: 0 K/UL — SIGNIFICANT CHANGE UP (ref 0–0)
NRBC # FLD: 0 K/UL — SIGNIFICANT CHANGE UP (ref 0–0)
NRBC BLD AUTO-RTO: 0 /100 WBCS — SIGNIFICANT CHANGE UP (ref 0–0)
PLATELET # BLD AUTO: 301 K/UL — SIGNIFICANT CHANGE UP (ref 150–400)
PMV BLD: 9.9 FL — SIGNIFICANT CHANGE UP (ref 7–13)
POTASSIUM SERPL-MCNC: 4.2 MMOL/L — SIGNIFICANT CHANGE UP (ref 3.5–5.3)
POTASSIUM SERPL-SCNC: 4.2 MMOL/L — SIGNIFICANT CHANGE UP (ref 3.5–5.3)
PROT SERPL-MCNC: 7.1 G/DL — SIGNIFICANT CHANGE UP (ref 6.6–8.7)
RBC # BLD: 4.5 M/UL — SIGNIFICANT CHANGE UP (ref 3.8–5.2)
RBC # FLD: 12.7 % — SIGNIFICANT CHANGE UP (ref 10.3–14.5)
SODIUM SERPL-SCNC: 139 MMOL/L — SIGNIFICANT CHANGE UP (ref 135–145)
TROPONIN T, HIGH SENSITIVITY RESULT: <6 NG/L — SIGNIFICANT CHANGE UP (ref 0–51)
WBC # BLD: 9.61 K/UL — SIGNIFICANT CHANGE UP (ref 3.8–10.5)
WBC # FLD AUTO: 9.61 K/UL — SIGNIFICANT CHANGE UP (ref 3.8–10.5)

## 2025-06-03 PROCEDURE — 84484 ASSAY OF TROPONIN QUANT: CPT

## 2025-06-03 PROCEDURE — 71046 X-RAY EXAM CHEST 2 VIEWS: CPT

## 2025-06-03 PROCEDURE — 85025 COMPLETE CBC W/AUTO DIFF WBC: CPT

## 2025-06-03 PROCEDURE — 93005 ELECTROCARDIOGRAM TRACING: CPT

## 2025-06-03 PROCEDURE — 84702 CHORIONIC GONADOTROPIN TEST: CPT

## 2025-06-03 PROCEDURE — 93010 ELECTROCARDIOGRAM REPORT: CPT

## 2025-06-03 PROCEDURE — 99284 EMERGENCY DEPT VISIT MOD MDM: CPT

## 2025-06-03 PROCEDURE — 80053 COMPREHEN METABOLIC PANEL: CPT

## 2025-06-03 PROCEDURE — 99285 EMERGENCY DEPT VISIT HI MDM: CPT | Mod: 25

## 2025-06-03 PROCEDURE — 83735 ASSAY OF MAGNESIUM: CPT

## 2025-06-03 PROCEDURE — 36415 COLL VENOUS BLD VENIPUNCTURE: CPT

## 2025-06-03 PROCEDURE — 71046 X-RAY EXAM CHEST 2 VIEWS: CPT | Mod: 26

## 2025-06-03 PROCEDURE — 96374 THER/PROPH/DIAG INJ IV PUSH: CPT

## 2025-06-03 RX ORDER — METHOCARBAMOL 500 MG/1
1500 TABLET, FILM COATED ORAL ONCE
Refills: 0 | Status: COMPLETED | OUTPATIENT
Start: 2025-06-03 | End: 2025-06-03

## 2025-06-03 RX ORDER — KETOROLAC TROMETHAMINE 30 MG/ML
15 INJECTION, SOLUTION INTRAMUSCULAR; INTRAVENOUS ONCE
Refills: 0 | Status: DISCONTINUED | OUTPATIENT
Start: 2025-06-03 | End: 2025-06-03

## 2025-06-03 RX ORDER — METHOCARBAMOL 500 MG/1
2 TABLET, FILM COATED ORAL
Qty: 18 | Refills: 0
Start: 2025-06-03 | End: 2025-06-05

## 2025-06-03 RX ORDER — ACETAMINOPHEN 500 MG/5ML
650 LIQUID (ML) ORAL ONCE
Refills: 0 | Status: COMPLETED | OUTPATIENT
Start: 2025-06-03 | End: 2025-06-03

## 2025-06-03 RX ORDER — IBUPROFEN 200 MG
1 TABLET ORAL
Qty: 28 | Refills: 0
Start: 2025-06-03 | End: 2025-06-09

## 2025-06-03 RX ADMIN — Medication 650 MILLIGRAM(S): at 20:13

## 2025-06-03 RX ADMIN — KETOROLAC TROMETHAMINE 15 MILLIGRAM(S): 30 INJECTION, SOLUTION INTRAMUSCULAR; INTRAVENOUS at 20:12

## 2025-06-03 RX ADMIN — METHOCARBAMOL 1500 MILLIGRAM(S): 500 TABLET, FILM COATED ORAL at 20:13

## 2025-06-03 NOTE — ED PROVIDER NOTE - HIV OFFER
[Normal Breath Sounds] : Normal breath sounds [Normal Heart Sounds] : normal heart sounds [Alert] : alert [Oriented to Person] : oriented to person [Oriented to Place] : oriented to place [Oriented to Time] : oriented to time [Calm] : calm [JVD] : no jugular venous distention  [Abdominal Masses] : No abdominal masses [Abdomen Tenderness] : ~T ~M No abdominal tenderness [Tender] : was nontender [Enlarged] : not enlarged [de-identified] : DEANA. Mady. Appropriate. Comfortable. [de-identified] : Pupils equal. No Scleral Icterus. NCAT. [de-identified] : Supple. Trachea midline. No overt lymphadenopathy. No JVD - Dialysis catheter chest.  [de-identified] : Abdomen is soft, non tender and non distended. Do not appreciate any overt mass. There are incisions that area healed abdominal wall. Reducible incisional hernias. Potentially multiple fascial defects.  Opt out

## 2025-06-03 NOTE — ED ADULT NURSE NOTE - OBJECTIVE STATEMENT
PT reports to ED with approx 1.5 weeks of headache with intermittent chest pain. PT denies trauma or persistent cough. PT reports that pain is reproducible increased with movement and coughing. denies fever or chills. PT reports history of high blood pressure and has been restarted her medications last week though reports she needs to follow up with cards to ensure adequate management. PT is pleasant and well appearing reports no shortness of breath or fever.

## 2025-06-03 NOTE — ED PROVIDER NOTE - ATTENDING APP SHARED VISIT CONTRIBUTION OF CARE
I have personally performed a history and physical examination of the patient and discussed management with the VINOD as well as the patient.  I reviewed the VINOD's note and agree with the documented findings and plan of care.  I have authored and modified critical sections of the Provider Note, including but not limited to HPI, Physical Exam and MDM.    HPI: 35-year-old female past medical history of hypertension presents complaining of chest pain that has been intermittent for the last 1.5 weeks.  Despite triage note denies headache.  Family history significant for father with cardiac bypass.  Symptoms nonexertional nonpositional.  Mild relief with OTC Tylenol.  No recent illness, fever, chills, cough, abdominal pain, nausea, vomiting, diarrhea.  No other current complaints this time.    ROS:   General: No fever, no chills, no malaise, no fatigue  Respiratory: No cough, no dyspnea, no pleuritic chest pain  Cardiac: See HPI  Abdomen: No abdominal pain, no nausea, no vomiting, no diarrhea  Musculoskeletal: No myalgia, no arthralgia  Neurologic: No headache, no vertigo, no paresthesia, no focal deficits  Skin: No rash, no evidence of trauma  All other ROS are negative    PE:  General: NAD; well appearing; A&O x3   Head: NC/AT  Eyes: PERRL, EOMI  ENT: Airway patent, mmm  Pulmonary: CTA b/l, symmetric breath sounds. No W/R/R.  Cardiac: s1s2, RRR, no M,G,R, No JVD  Abdomen: +BS, ND, NT, soft, no guarding, no rebound, no masses , no rigidity  Back: Normal  spine  Extremities: FROM, symmetric pulses, capillary refill < 2 seconds, no edema, 5/5 strength in b/l UE and LE  Skin: no rash or bruising  Neurologic: alert, speech clear, no focal deficits  Psych: nl mood/affect, nl insight.    MDM: 35-year-old female past medical history of hypertension presents complaining of chest pain that has been intermittent for the last 1.5 weeks.  Low suspicion for ACS.  Will obtain CBC, CMP, TNI, EKG, CXR.  Consider costochondritis versus PNA.  Will provide symptomatic control as needed.  Disposition pending results.

## 2025-06-03 NOTE — ED PROVIDER NOTE - PATIENT PORTAL LINK FT
You can access the FollowMyHealth Patient Portal offered by [facility] by registering at the following website: [email or web address]. By joining [facility]’s FollowMyHealth portal, you will also be able to view your health information using other applications (apps) compatible with our system.

## 2025-06-03 NOTE — ED PROVIDER NOTE - NSFOLLOWUPCLINICS_GEN_ALL_ED_FT
Cardiology at [facility] ([facility])  Cardiology  [address], [address]  [place] [address]  Phone: [phone]  Fax:   Follow Up Time: 7-10 Days

## 2025-06-04 ENCOUNTER — TRANSCRIPTION ENCOUNTER (OUTPATIENT)
Age: 35
End: 2025-06-04

## 2025-07-17 ENCOUNTER — APPOINTMENT (OUTPATIENT)
Dept: CARDIOLOGY | Facility: CLINIC | Age: 35
End: 2025-07-17
Payer: COMMERCIAL

## 2025-07-17 VITALS
WEIGHT: 214 LBS | BODY MASS INDEX: 39.38 KG/M2 | OXYGEN SATURATION: 100 % | SYSTOLIC BLOOD PRESSURE: 154 MMHG | DIASTOLIC BLOOD PRESSURE: 98 MMHG | HEART RATE: 86 BPM | HEIGHT: 62 IN

## 2025-07-17 VITALS — SYSTOLIC BLOOD PRESSURE: 158 MMHG | DIASTOLIC BLOOD PRESSURE: 106 MMHG

## 2025-07-17 PROBLEM — R00.2 PALPITATIONS: Status: ACTIVE | Noted: 2025-07-17

## 2025-07-17 PROBLEM — R07.89 CHEST DISCOMFORT: Status: RESOLVED | Noted: 2022-02-25 | Resolved: 2025-07-17

## 2025-07-17 PROCEDURE — 99204 OFFICE O/P NEW MOD 45 MIN: CPT | Mod: 25

## 2025-07-17 PROCEDURE — 93000 ELECTROCARDIOGRAM COMPLETE: CPT

## 2025-07-17 RX ORDER — AMLODIPINE BESYLATE 10 MG/1
10 TABLET ORAL DAILY
Qty: 60 | Refills: 4 | Status: ACTIVE | COMMUNITY
Start: 2025-07-17 | End: 1900-01-01

## 2025-07-17 RX ORDER — LOSARTAN POTASSIUM 25 MG/1
25 TABLET, FILM COATED ORAL DAILY
Qty: 90 | Refills: 3 | Status: ACTIVE | COMMUNITY
Start: 2025-07-17 | End: 1900-01-01

## 2025-08-01 ENCOUNTER — TRANSCRIPTION ENCOUNTER (OUTPATIENT)
Age: 35
End: 2025-08-01

## 2025-08-07 ENCOUNTER — APPOINTMENT (OUTPATIENT)
Dept: CARDIOLOGY | Facility: CLINIC | Age: 35
End: 2025-08-07
Payer: COMMERCIAL

## 2025-08-07 PROCEDURE — 93306 TTE W/DOPPLER COMPLETE: CPT

## 2025-08-20 ENCOUNTER — NON-APPOINTMENT (OUTPATIENT)
Age: 35
End: 2025-08-20

## 2025-08-21 ENCOUNTER — APPOINTMENT (OUTPATIENT)
Dept: OBGYN | Facility: CLINIC | Age: 35
End: 2025-08-21
Payer: COMMERCIAL

## 2025-08-21 VITALS
HEIGHT: 62 IN | DIASTOLIC BLOOD PRESSURE: 98 MMHG | SYSTOLIC BLOOD PRESSURE: 140 MMHG | BODY MASS INDEX: 39.56 KG/M2 | WEIGHT: 215 LBS

## 2025-08-21 DIAGNOSIS — N89.8 OTHER SPECIFIED NONINFLAMMATORY DISORDERS OF VAGINA: ICD-10-CM

## 2025-08-21 PROCEDURE — 99213 OFFICE O/P EST LOW 20 MIN: CPT

## 2025-08-21 PROCEDURE — 99459 PELVIC EXAMINATION: CPT

## 2025-08-21 RX ORDER — METRONIDAZOLE 500 MG/1
500 TABLET ORAL TWICE DAILY
Qty: 10 | Refills: 1 | Status: ACTIVE | COMMUNITY
Start: 2025-08-21 | End: 1900-01-01

## 2025-08-21 RX ORDER — VALACYCLOVIR 1 G/1
1 TABLET, FILM COATED ORAL 3 TIMES DAILY
Qty: 30 | Refills: 0 | Status: ACTIVE | COMMUNITY
Start: 2025-08-21 | End: 1900-01-01

## 2025-08-25 ENCOUNTER — APPOINTMENT (OUTPATIENT)
Dept: CARDIOLOGY | Facility: CLINIC | Age: 35
End: 2025-08-25
Payer: COMMERCIAL

## 2025-08-25 PROCEDURE — 76775 US EXAM ABDO BACK WALL LIM: CPT | Mod: 59

## 2025-08-25 PROCEDURE — 93975 VASCULAR STUDY: CPT

## 2025-08-26 LAB
BV BACTERIA RRNA VAG QL NAA+PROBE: NOT DETECTED
C GLABRATA RNA VAG QL NAA+PROBE: NOT DETECTED
CANDIDA RRNA VAG QL PROBE: DETECTED
HHV SPEC CULT: ABNORMAL
HSV TYPE 1: NORMAL
HSV TYPE 2: ABNORMAL
T VAGINALIS RRNA SPEC QL NAA+PROBE: NOT DETECTED

## 2025-08-26 RX ORDER — FLUCONAZOLE 150 MG/1
150 TABLET ORAL
Qty: 1 | Refills: 0 | Status: ACTIVE | COMMUNITY
Start: 2025-08-26 | End: 1900-01-01

## 2025-09-11 DIAGNOSIS — B00.9 HERPESVIRAL INFECTION, UNSPECIFIED: ICD-10-CM

## 2025-09-11 RX ORDER — VALACYCLOVIR 500 MG/1
500 TABLET, FILM COATED ORAL TWICE DAILY
Qty: 14 | Refills: 1 | Status: ACTIVE | COMMUNITY
Start: 2025-09-11